# Patient Record
Sex: MALE | Race: WHITE | NOT HISPANIC OR LATINO | Employment: FULL TIME | URBAN - METROPOLITAN AREA
[De-identification: names, ages, dates, MRNs, and addresses within clinical notes are randomized per-mention and may not be internally consistent; named-entity substitution may affect disease eponyms.]

---

## 2017-01-03 ENCOUNTER — HOSPITAL ENCOUNTER (OUTPATIENT)
Dept: OTHER | Facility: HOSPITAL | Age: 52
Discharge: HOME | End: 2017-01-03
Attending: THORACIC SURGERY (CARDIOTHORACIC VASCULAR SURGERY) | Admitting: THORACIC SURGERY (CARDIOTHORACIC VASCULAR SURGERY)

## 2017-01-03 LAB
ANION GAP SERPL CALC-SCNC: 13.5 MMOL/L (ref 10–20)
BUN SERPL-MCNC: 16 MG/DL (ref 8–20)
BUN/CREAT SERPL: 14.5 (ref 6.2–20.3)
CALCIUM SERPL-MCNC: 9.4 MG/DL (ref 8.9–10.3)
CHLORIDE SERPL-SCNC: 105 MMOL/L (ref 101–111)
CONV CO2: 25 MMOL/L (ref 22–32)
CREAT UR-MCNC: 1.1 MG/DL (ref 0.7–1.2)
GLUCOSE SERPL-MCNC: 122 MG/DL (ref 65–99)
POTASSIUM SERPL-SCNC: 3.5 MMOL/L (ref 3.6–5.1)
SODIUM SERPL-SCNC: 140 MMOL/L (ref 136–144)

## 2017-01-04 ENCOUNTER — HOSPITAL ENCOUNTER (OUTPATIENT)
Dept: CT IMAGING | Facility: HOSPITAL | Age: 52
Discharge: HOME | End: 2017-01-04
Attending: THORACIC SURGERY (CARDIOTHORACIC VASCULAR SURGERY) | Admitting: THORACIC SURGERY (CARDIOTHORACIC VASCULAR SURGERY)

## 2017-01-04 LAB — CONV AFP: 5 NG/ML (ref 0–9)

## 2017-01-05 ENCOUNTER — TELEPHONE (OUTPATIENT)
Dept: CARDIAC SURGERY | Facility: CLINIC | Age: 52
End: 2017-01-05

## 2017-01-05 NOTE — TELEPHONE ENCOUNTER
After speaking with Dr Hernández who has viewed the test ordered for Mr Turner I called and spoke to Mr Turner and let him knopw Dr Hernández is ready to proceed with surgery and Gian from our office will contact him shortly to set up a time

## 2017-01-06 ENCOUNTER — TELEPHONE (OUTPATIENT)
Dept: CARDIAC SURGERY | Facility: CLINIC | Age: 52
End: 2017-01-06

## 2017-01-09 LAB — INTERPRETATION: NORMAL

## 2017-01-12 ENCOUNTER — OUTSIDE FACILITY SERVICE (OUTPATIENT)
Dept: CARDIAC SURGERY | Facility: CLINIC | Age: 52
End: 2017-01-12

## 2017-01-12 PROCEDURE — 33030 PARTIAL REMOVAL OF HEART SAC: CPT | Performed by: THORACIC SURGERY (CARDIOTHORACIC VASCULAR SURGERY)

## 2017-01-12 PROCEDURE — 39220 RESECT MEDIASTINAL TUMOR: CPT | Performed by: THORACIC SURGERY (CARDIOTHORACIC VASCULAR SURGERY)

## 2017-01-12 PROCEDURE — 39220 RESECT MEDIASTINAL TUMOR: CPT | Performed by: SPECIALIST/TECHNOLOGIST, OTHER

## 2017-01-12 PROCEDURE — 33030 PARTIAL REMOVAL OF HEART SAC: CPT | Performed by: SPECIALIST/TECHNOLOGIST, OTHER

## 2017-01-19 ENCOUNTER — TELEPHONE (OUTPATIENT)
Dept: CARDIAC SURGERY | Facility: CLINIC | Age: 52
End: 2017-01-19

## 2017-01-19 NOTE — TELEPHONE ENCOUNTER
Mr Johnny has noticed several areas  Shoulder, thighs, torso where he has a red rash. He said he has waited to call but it does not seem to be improving. I spoke with Cleo SOMMERS who suggested that he try benadryl or hydrocortisone cream. He is going to take some benadryl by mouth and will use hydrocortisone cream. If this does not improve over the next day or so he will call back and we will have have him seen in the office

## 2017-01-20 ENCOUNTER — DOCUMENTATION (OUTPATIENT)
Dept: CARDIAC SURGERY | Facility: CLINIC | Age: 52
End: 2017-01-20

## 2017-01-20 NOTE — PROGRESS NOTES
Pathology report from 1/12/2017 was posted today.  Obtained and results noted.  Call placed to Dr. Hernández and report faxed to [facility] for him to review after he finished his case.  Total path and report from [facility] consultation faxed.  Telephone number and cell number sent to Dr. Hernández as well.

## 2017-01-24 ENCOUNTER — TELEPHONE (OUTPATIENT)
Dept: CARDIAC SURGERY | Facility: CLINIC | Age: 52
End: 2017-01-24

## 2017-01-24 NOTE — TELEPHONE ENCOUNTER
Dr Hernández spoke with Dr Dover and asked that I follow up this call and arrange an appointment for Mr Turner. I spoke with Clarence the  who in turn discussed with Dr Dover. Mr Turner will be seen this week 1/26/17 at 8:30 by Dr Cardoso. I spoke with him and this time was agreeable to him

## 2017-01-26 ENCOUNTER — CONSULT (OUTPATIENT)
Dept: ONCOLOGY | Facility: CLINIC | Age: 52
End: 2017-01-26

## 2017-01-26 ENCOUNTER — LAB (OUTPATIENT)
Dept: LAB | Facility: HOSPITAL | Age: 52
End: 2017-01-26

## 2017-01-26 VITALS
DIASTOLIC BLOOD PRESSURE: 72 MMHG | RESPIRATION RATE: 16 BRPM | WEIGHT: 160.2 LBS | HEIGHT: 69 IN | SYSTOLIC BLOOD PRESSURE: 120 MMHG | OXYGEN SATURATION: 96 % | TEMPERATURE: 97.5 F | HEART RATE: 92 BPM | BODY MASS INDEX: 23.73 KG/M2

## 2017-01-26 DIAGNOSIS — C38.3 EXTRAGONADAL GERM CELL TUMOR OF MEDIASTINUM (HCC): ICD-10-CM

## 2017-01-26 DIAGNOSIS — J98.59 MEDIASTINAL MASS: Primary | ICD-10-CM

## 2017-01-26 LAB
ALBUMIN SERPL-MCNC: 3.9 G/DL (ref 3.5–5.2)
ALBUMIN/GLOB SERPL: 1.3 G/DL (ref 1.1–2.4)
ALP SERPL-CCNC: 121 U/L (ref 38–116)
ALT SERPL W P-5'-P-CCNC: 22 U/L (ref 0–41)
ANION GAP SERPL CALCULATED.3IONS-SCNC: 13 MMOL/L
AST SERPL-CCNC: 19 U/L (ref 0–40)
BASOPHILS # BLD AUTO: 0.05 10*3/MM3 (ref 0–0.1)
BASOPHILS NFR BLD AUTO: 0.7 % (ref 0–1.1)
BILIRUB SERPL-MCNC: 0.3 MG/DL (ref 0.1–1.2)
BUN BLD-MCNC: 18 MG/DL (ref 6–20)
BUN/CREAT SERPL: 15.7 (ref 7.3–30)
CALCIUM SPEC-SCNC: 9.5 MG/DL (ref 8.5–10.2)
CHLORIDE SERPL-SCNC: 103 MMOL/L (ref 98–107)
CO2 SERPL-SCNC: 26 MMOL/L (ref 22–29)
CREAT BLD-MCNC: 1.15 MG/DL (ref 0.7–1.3)
DEPRECATED RDW RBC AUTO: 41.2 FL (ref 37–49)
EOSINOPHIL # BLD AUTO: 0.25 10*3/MM3 (ref 0–0.36)
EOSINOPHIL NFR BLD AUTO: 3.7 % (ref 1–5)
ERYTHROCYTE [DISTWIDTH] IN BLOOD BY AUTOMATED COUNT: 13.4 % (ref 11.7–14.5)
GFR SERPL CREATININE-BSD FRML MDRD: 67 ML/MIN/1.73
GLOBULIN UR ELPH-MCNC: 2.9 GM/DL (ref 1.8–3.5)
GLUCOSE BLD-MCNC: 112 MG/DL (ref 74–124)
HCT VFR BLD AUTO: 38.4 % (ref 40–49)
HGB BLD-MCNC: 12.8 G/DL (ref 13.5–16.5)
HOLD SPECIMEN: NORMAL
IMM GRANULOCYTES # BLD: 0.15 10*3/MM3 (ref 0–0.03)
IMM GRANULOCYTES NFR BLD: 2.2 % (ref 0–0.5)
LDH SERPL-CCNC: 171 U/L (ref 99–259)
LYMPHOCYTES # BLD AUTO: 2.76 10*3/MM3 (ref 1–3.5)
LYMPHOCYTES NFR BLD AUTO: 41.2 % (ref 20–49)
MCH RBC QN AUTO: 29 PG (ref 27–33)
MCHC RBC AUTO-ENTMCNC: 33.3 G/DL (ref 32–35)
MCV RBC AUTO: 87.1 FL (ref 83–97)
MONOCYTES # BLD AUTO: 0.65 10*3/MM3 (ref 0.25–0.8)
MONOCYTES NFR BLD AUTO: 9.7 % (ref 4–12)
NEUTROPHILS # BLD AUTO: 2.84 10*3/MM3 (ref 1.5–7)
NEUTROPHILS NFR BLD AUTO: 42.5 % (ref 39–75)
NRBC BLD MANUAL-RTO: 0 /100 WBC (ref 0–0)
PLATELET # BLD AUTO: 469 10*3/MM3 (ref 150–375)
PMV BLD AUTO: 8.2 FL (ref 8.9–12.1)
POTASSIUM BLD-SCNC: 4.4 MMOL/L (ref 3.5–4.7)
PROT SERPL-MCNC: 6.8 G/DL (ref 6.3–8)
RBC # BLD AUTO: 4.41 10*6/MM3 (ref 4.3–5.5)
SODIUM BLD-SCNC: 142 MMOL/L (ref 134–145)
WBC NRBC COR # BLD: 6.7 10*3/MM3 (ref 4–10)
WHOLE BLOOD HOLD SPECIMEN: NORMAL
WHOLE BLOOD HOLD SPECIMEN: NORMAL

## 2017-01-26 PROCEDURE — 99245 OFF/OP CONSLTJ NEW/EST HI 55: CPT | Performed by: INTERNAL MEDICINE

## 2017-01-26 PROCEDURE — 85025 COMPLETE CBC W/AUTO DIFF WBC: CPT | Performed by: INTERNAL MEDICINE

## 2017-01-26 PROCEDURE — 80053 COMPREHEN METABOLIC PANEL: CPT | Performed by: INTERNAL MEDICINE

## 2017-01-26 PROCEDURE — 83615 LACTATE (LD) (LDH) ENZYME: CPT | Performed by: INTERNAL MEDICINE

## 2017-01-26 PROCEDURE — 36415 COLL VENOUS BLD VENIPUNCTURE: CPT | Performed by: INTERNAL MEDICINE

## 2017-01-27 LAB
AFP-TM SERPL-MCNC: 2.9 NG/ML (ref 0–8.3)
HCG INTACT+B SERPL-ACNC: <1 MIU/ML (ref 0–3)

## 2017-01-27 NOTE — PROGRESS NOTES
Subjective     REASON FOR CONSULTATION:  Mediastinal germ cell tumor  Provide an opinion on any further workup or treatment                             REQUESTING PHYSICIAN:  Jerome Hernández MD    RECORDS OBTAINED:  Records of the patients history including those obtained from the referring provider were reviewed and summarized in detail.    HISTORY OF PRESENT ILLNESS:  The patient is a 51 y.o. year old male who is here for an opinion about the above issue.    History of Present Illness patient is a 51-year-old male who presented to his family doctor in September 2015 with a cough that developed while he was lying on his back.  Patient was felt to have allergies and given some medications for this but the symptoms don't improve.  He was then referred to ENT who evaluated him and scoped him and thought he had reflux symptoms and gave her medications for this.  Patient still do not get better once back to see the ENT doctor was CAT scan the sinuses and thought he may have some sinus problems.  He was then referred to an allergist who diagnosed some allergies and gave her medications again with no significant improvement.  He is treated for asthma and again did not improve.  Finally when his symptoms worsened a chest CT was done the end of December which showed a 16 x 10 x 11 cm anterior mediastinal mass with no parenchymal nodules or adenopathy in the chest or abdomen.  Patient saw Dr. Hernández who ordered beta hCG and alpha-fetoprotein which were normal and then proceeded with a resection of the tumor which was done on 1/12/17 .    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  The tumor was lifted  off the great vessels and apparently was completely resected in toto.  The cut surface of the tumor showed multiple cysts with very little solid tissue present between cystic lesions in numerous areas of possible calcification was identified identified.  The slides were reviewed at [facility] who confirmed the diagnosis.    Stop really the patient has done very well and his breathing and discomfort have dramatically improved and he stopped most of his inhalers and asthma medications.  He's had no weight loss headache or other systemic complaints.  The patient reports chronic back pain due to an injury to L4-L5 and S1 many years ago and some frequency of urination at night but otherwise feels very well and actually moved into a new home 2 weeks before his surgery.    Past Medical History   Diagnosis Date   • Anxiety    • Hyperlipidemia    • Low iron         Past Surgical History   Procedure Laterality Date   • Endoscopy     • Colonoscopy     • Foot surgery       right   • Knee arthroscopy       right   • Lasik     • Colonoscopy N/A 7/25/2016     Procedure: COLONOSCOPY INTO CECUM/TERMINAL ILEUM WITH POLYPECTOMY;  Surgeon: Jarvis Garcia MD;  Location: [facility] ENDOSCOPY;  Service:    • Endoscopy N/A 7/25/2016     Procedure: ESOPHAGOGASTRODUODENOSCOPY WITH DILATATION;  Surgeon: Jarvis Garcia MD;  Location: [facility] ENDOSCOPY;  Service:         Current Outpatient Prescriptions on File Prior to Visit   Medication Sig Dispense Refill   • ALPRAZolam (XANAX) 0.25 MG tablet take 1 tablet by mouth four times a day if needed  0   • diphenhydrAMINE (BENADRYL) 25 mg capsule Take 25 mg by mouth as needed for itching.     • DULERA 100-5 MCG/ACT inhaler INHALE 2 INHALATIONS TWICE DAILY WITH SPACER DEVICE. USE REGULARLY. RINSE MOUTH AFTER USE  1   • gemfibrozil (LOPID) 600 MG tablet   0   • Ibuprofen (MOTRIN PO) Take  by mouth as needed.     • Multiple Vitamins-Minerals (MULTI COMPLETE PO) Take  by mouth daily.     •  "oxybutynin (DITROPAN) 5 MG tablet   0   • [DISCONTINUED] gabapentin (NEURONTIN) 300 MG capsule   0   • [DISCONTINUED] montelukast (SINGULAIR) 5 MG chewable tablet Chew 5 mg every night.     • [DISCONTINUED] simvastatin (ZOCOR) 20 MG tablet   0   • [DISCONTINUED] traMADol (ULTRAM) 50 MG tablet   0     No current facility-administered medications on file prior to visit.         ALLERGIES:  No Known Allergies     Social History     Social History   • Marital status:      Spouse name: N/A   • Number of children: N/A   • Years of education: N/A     Social History Main Topics   • Smoking status: Never Smoker   • Smokeless tobacco: Never Used   • Alcohol use Yes      Comment: occ liq or beer   • Drug use: No   • Sexual activity: Not Asked     Other Topics Concern   • None     Social History Narrative        Family History   Problem Relation Age of Onset   • Heart failure Father     parents are in their 70s and healthy his one sister is healthy and 3 children are healthy maternal aunt had an unusual cancer in the gallbladder in her 40s his paternal grandmother had malignancy in her 40s of unknown type    Review of Systems   Genitourinary: Positive for frequency.   Musculoskeletal: Positive for back pain.   Skin: Positive for rash.        Rashes related to tape applied after surgery        Objective     Vitals:    01/26/17 0902   BP: 120/72   Pulse: 92   Resp: 16   Temp: 97.5 °F (36.4 °C)   TempSrc: Oral   SpO2: 96%   Weight: 160 lb 3.2 oz (72.7 kg)   Height: 69\" (175.3 cm)   PainSc:   3   PainLoc: Chest     Current Status 1/26/2017   ECOG score 0       Physical Exam    GENERAL:  Well-developed, well-nourished in no acute distress.   SKIN:  Warm, dry without rashes, purpura or petechiae.  EYES:  Pupils equal, round and reactive to light.  EOMs intact.  Conjunctivae normal.  EARS:  Hearing intact.  NOSE:  Septum midline.  No excoriations or nasal discharge.  MOUTH:  Tongue is well-papillated; no stomatitis or ulcers.  " Lips normal.  THROAT:  Oropharynx without lesions or exudates.  NECK:  Supple with good range of motion; no thyromegaly or masses, no JVD.  LYMPHATICS:  No cervical, supraclavicular, axillary or inguinal adenopathy.  CHEST:  Lungs clear to auscultation. Good airflow.  Sternotomy incision is well-healed  CARDIAC:  Regular rate and rhythm without murmurs, rubs or gallops. Normal S1,S2.  ABDOMEN:  Soft, nontender with no hepatosplenomegaly or masses.  EXTREMITIES:  No clubbing, cyanosis or edema.  NEUROLOGICAL:  Cranial Nerves II-XII grossly intact.  No focal neurological deficits.  PSYCHIATRIC:  Normal affect and mood.        RECENT LABS:  Hematology WBC   Date Value Ref Range Status   01/26/2017 6.70 4.00 - 10.00 10*3/mm3 Final     RBC   Date Value Ref Range Status   01/26/2017 4.41 4.30 - 5.50 10*6/mm3 Final     HEMOGLOBIN   Date Value Ref Range Status   01/26/2017 12.8 (L) 13.5 - 16.5 g/dL Final     HEMATOCRIT   Date Value Ref Range Status   01/26/2017 38.4 (L) 40.0 - 49.0 % Final     PLATELETS   Date Value Ref Range Status   01/26/2017 469 (H) 150 - 375 10*3/mm3 Final          Assessment/Plan     1.  Mediastinal germ cell tumor-?  Completely resected with normal preop alpha fetoprotein beta-hCG    Plan  1.  Repeat tumor markers and LDH today  2.Testicular ultrasound and repeat pulmonary function tests in about 2 weeks  3..  Flow cytometry of his  peripheral blood, because of a relative lymphocytosis and the known association between mediastinal germ cell tumors and bone marrow disorders    We discussed treatment options with him and I explained to Con and his wife that we often make the diagnosis of mediastinal germ cell tumor before complete excision and would usually have given chemotherapy followed by surgery.  Because mediastinal germ cells are high risk and have a poorer  prognosis than gonadal germ cell tumors standard of care has been the use of Velban ifosfamide and cisplatin rather than the standard PEB  regimen but the normal tumor markers prompted surgery.    We talked about surveillance and initiation of chemotherapy for recurrence and I told him that unlike a low risk  gonadal clinical stage I cancer where we can follow closely with surveillance CAT scans, the fact that he is high risk based on his tumor being extragonadal I'm not sure this is a  good  option but I cannot find much data on this particular situation that he is in and I will hopefully get more information from talking to Dr. Gonzalez or his colleagues at [facility] and  send him up there for consultation before any treatment decisions are made.    ADD: I talked to Dr Alfaro in Dr Gonzalez's absence and he felt the malignant cell type was relatively chemoresistant and that observation might be best but he suggested the pt meet with Dr Gonzalez and we will set this up.    Follow-up in 3 weeks with the repeat tumor markers, PFTs and ultrasound of the testicles planned.  A PET scan can be done but I want to wait until he is  further from surgery before scheduling this

## 2017-01-31 ENCOUNTER — HOSPITAL ENCOUNTER (OUTPATIENT)
Dept: CARDIOLOGY | Facility: HOSPITAL | Age: 52
Discharge: HOME | End: 2017-01-31
Attending: INTERNAL MEDICINE | Admitting: INTERNAL MEDICINE

## 2017-02-03 LAB — REF LAB TEST METHOD: NORMAL

## 2017-02-16 ENCOUNTER — APPOINTMENT (OUTPATIENT)
Dept: ONCOLOGY | Facility: CLINIC | Age: 52
End: 2017-02-16

## 2017-02-16 ENCOUNTER — OFFICE VISIT (OUTPATIENT)
Dept: CARDIAC SURGERY | Facility: CLINIC | Age: 52
End: 2017-02-16

## 2017-02-16 ENCOUNTER — APPOINTMENT (OUTPATIENT)
Dept: LAB | Facility: HOSPITAL | Age: 52
End: 2017-02-16

## 2017-02-16 VITALS
HEART RATE: 91 BPM | TEMPERATURE: 97.2 F | RESPIRATION RATE: 20 BRPM | OXYGEN SATURATION: 98 % | HEIGHT: 69 IN | SYSTOLIC BLOOD PRESSURE: 129 MMHG | DIASTOLIC BLOOD PRESSURE: 85 MMHG | BODY MASS INDEX: 24.11 KG/M2 | WEIGHT: 162.8 LBS

## 2017-02-16 DIAGNOSIS — C38.3 EXTRAGONADAL GERM CELL TUMOR OF MEDIASTINUM (HCC): ICD-10-CM

## 2017-02-16 DIAGNOSIS — J98.59 MEDIASTINAL MASS: Primary | ICD-10-CM

## 2017-02-16 PROCEDURE — 99024 POSTOP FOLLOW-UP VISIT: CPT | Performed by: THORACIC SURGERY (CARDIOTHORACIC VASCULAR SURGERY)

## 2017-02-16 RX ORDER — PREDNISONE 20 MG/1
TABLET ORAL
Refills: 0 | COMMUNITY
Start: 2017-01-20 | End: 2017-02-23

## 2017-02-16 RX ORDER — CLARITHROMYCIN 500 MG/1
TABLET, COATED ORAL
COMMUNITY
Start: 2016-12-15 | End: 2017-02-23

## 2017-02-16 RX ORDER — HYDROCODONE BITARTRATE AND ACETAMINOPHEN 5; 325 MG/1; MG/1
TABLET ORAL
Refills: 0 | COMMUNITY
Start: 2017-01-16 | End: 2017-02-16

## 2017-02-19 NOTE — PROGRESS NOTES
2/19/2017      Chief Complaint:     Follow up evaluation post removal of mediastinal mass    History of Present Illness:       Dear Dr. Jimenez and Colleagues,  It was nice to see Julien Turner in follow up evaluation for his teratocarcinoma s/p resection. He is a 51 y.o. male with a history of persistent cough and a giant mediastinal mass s/p radical excision and pericardiectomy by me in 1/12/17 . He did well from surgery and continues to do well. He denies major issues except for residual sternal pain, what is to be expected. Unfortunately, his pathology revealed areas of non seminomatous carcinoma. He was seen in [facility] by a mediastinal tumor specialist. The indication was to follow with serial chest CTs and no radiation or chemotherapy at this point.    Patient Active Problem List   Diagnosis   • Mediastinal mass   • Cough   • Pure hypercholesterolemia   • Intermittent palpitations   • Extragonadal germ cell tumor of mediastinum       Past Medical History   Diagnosis Date   • Anxiety    • Hyperlipidemia    • Low iron        Past Surgical History   Procedure Laterality Date   • Endoscopy     • Colonoscopy     • Foot surgery       right   • Knee arthroscopy       right   • Lasik     • Colonoscopy N/A 7/25/2016     Procedure: COLONOSCOPY INTO CECUM/TERMINAL ILEUM WITH POLYPECTOMY;  Surgeon: Jarvis Garcia MD;  Location: [facility] ENDOSCOPY;  Service:    • Endoscopy N/A 7/25/2016     Procedure: ESOPHAGOGASTRODUODENOSCOPY WITH DILATATION;  Surgeon: Jarvis Garcia MD;  Location: [facility] ENDOSCOPY;  Service:    • Mediastinal mass excision  01/12/2017     Dr Hernández   • Pericardiectomy  01/12/2017     Dr Hernández       No Known Allergies      Current Outpatient Prescriptions:   •  ALPRAZolam (XANAX) 0.25 MG tablet, take 1 tablet by mouth four times a day if needed, Disp: , Rfl: 0  •  clarithromycin (BIAXIN) 500 MG tablet, , Disp: , Rfl:   •  diphenhydrAMINE (BENADRYL) 25 mg capsule, Take 25 mg by mouth as  needed for itching., Disp: , Rfl:   •  DULERA 100-5 MCG/ACT inhaler, INHALE 2 INHALATIONS TWICE DAILY WITH SPACER DEVICE. USE REGULARLY. RINSE MOUTH AFTER USE, Disp: , Rfl: 1  •  gemfibrozil (LOPID) 600 MG tablet, , Disp: , Rfl: 0  •  Ibuprofen (MOTRIN PO), Take  by mouth as needed., Disp: , Rfl:   •  metoprolol tartrate (LOPRESSOR) 25 MG tablet, take 1/2 tablet by mouth twice a day, Disp: , Rfl: 0  •  Multiple Vitamins-Minerals (MULTI COMPLETE PO), Take  by mouth daily., Disp: , Rfl:   •  oxybutynin (DITROPAN) 5 MG tablet, , Disp: , Rfl: 0  •  predniSONE (DELTASONE) 20 MG tablet, take 3 tablets by mouth once daily for 3 days then take 2 tablets...  (REFER TO PRESCRIPTION NOTES)., Disp: , Rfl: 0    Social History     Social History   • Marital status:      Spouse name: Chloé   • Number of children: N/A   • Years of education: N/A     Occupational History   •  [employer] [place]     Social History Main Topics   • Smoking status: Never Smoker   • Smokeless tobacco: Never Used   • Alcohol use Yes      Comment: occ liq or beer   • Drug use: No   • Sexual activity: Not on file     Other Topics Concern   • Not on file     Social History Narrative       Family History   Problem Relation Age of Onset   • Heart failure Father      Review of Systems   Respiratory: Positive for chest tightness.    All other systems reviewed and are negative.      Physical Exam:    Vital Signs:  Weight: 162 lb 12.8 oz (73.8 kg)   Body mass index is 24.04 kg/(m^2).  Temp: 97.2 °F (36.2 °C)   Heart Rate: 91   BP: 129/85     Physical Exam   Constitutional: He is oriented to person, place, and time. He appears well-developed and well-nourished.   HENT:   Head: Normocephalic and atraumatic.   Nose: Nose normal.   Mouth/Throat: Oropharynx is clear and moist.   Eyes: Conjunctivae are normal. Pupils are equal, round, and reactive to light.   Neck: Normal range of motion. Neck supple. No JVD present. No thyromegaly present.    Cardiovascular: Normal rate, regular rhythm, normal heart sounds and intact distal pulses.  Exam reveals no gallop and no friction rub.    No murmur heard.  Pulmonary/Chest: Effort normal and breath sounds normal. He has no rales.   Abdominal: Soft. Bowel sounds are normal. He exhibits no distension and no mass. There is no tenderness.   Musculoskeletal: Normal range of motion. He exhibits no tenderness or deformity.   Lymphadenopathy:     He has no cervical adenopathy.   Neurological: He is alert and oriented to person, place, and time. He has normal reflexes.   Skin: Skin is warm and dry. No rash noted. No erythema.   Psychiatric: He has a normal mood and affect. His behavior is normal.   Sternal incision is well healed     Assessment:     Problem List Items Addressed This Visit        Cardiovascular and Mediastinum    Mediastinal mass - Primary    Extragonadal germ cell tumor of mediastinum        Doing well post surgery. I agree with Oncology plans.  I believe the tumor margins were negative    Recommendation/Plan:     Follow up visit in 6 months      Thank you for allowing me to participate in his care.    Regards,    Jerome Hernández M.D.

## 2017-02-23 ENCOUNTER — OFFICE VISIT (OUTPATIENT)
Dept: ONCOLOGY | Facility: CLINIC | Age: 52
End: 2017-02-23

## 2017-02-23 ENCOUNTER — LAB (OUTPATIENT)
Dept: LAB | Facility: HOSPITAL | Age: 52
End: 2017-02-23

## 2017-02-23 ENCOUNTER — APPOINTMENT (OUTPATIENT)
Dept: ONCOLOGY | Facility: CLINIC | Age: 52
End: 2017-02-23

## 2017-02-23 VITALS
WEIGHT: 165.8 LBS | SYSTOLIC BLOOD PRESSURE: 108 MMHG | TEMPERATURE: 97.8 F | RESPIRATION RATE: 12 BRPM | HEIGHT: 69 IN | OXYGEN SATURATION: 99 % | HEART RATE: 85 BPM | DIASTOLIC BLOOD PRESSURE: 82 MMHG | BODY MASS INDEX: 24.56 KG/M2

## 2017-02-23 DIAGNOSIS — C71.9 PNET (PRIMITIVE NEUROECTODERMAL TUMOR) (HCC): Primary | ICD-10-CM

## 2017-02-23 DIAGNOSIS — C38.3 EXTRAGONADAL GERM CELL TUMOR OF MEDIASTINUM (HCC): Primary | ICD-10-CM

## 2017-02-23 DIAGNOSIS — C38.3 EXTRAGONADAL GERM CELL TUMOR OF MEDIASTINUM (HCC): ICD-10-CM

## 2017-02-23 LAB
ALBUMIN SERPL-MCNC: 4.3 G/DL (ref 3.5–5.2)
ALBUMIN/GLOB SERPL: 1.7 G/DL (ref 1.1–2.4)
ALP SERPL-CCNC: 83 U/L (ref 38–116)
ALT SERPL W P-5'-P-CCNC: 18 U/L (ref 0–41)
ANION GAP SERPL CALCULATED.3IONS-SCNC: 10.9 MMOL/L
AST SERPL-CCNC: 21 U/L (ref 0–40)
BASOPHILS # BLD AUTO: 0.03 10*3/MM3 (ref 0–0.1)
BASOPHILS NFR BLD AUTO: 0.7 % (ref 0–1.1)
BILIRUB SERPL-MCNC: 0.2 MG/DL (ref 0.1–1.2)
BUN BLD-MCNC: 18 MG/DL (ref 6–20)
BUN/CREAT SERPL: 18.4 (ref 7.3–30)
CALCIUM SPEC-SCNC: 9.3 MG/DL (ref 8.5–10.2)
CHLORIDE SERPL-SCNC: 104 MMOL/L (ref 98–107)
CO2 SERPL-SCNC: 26.1 MMOL/L (ref 22–29)
CREAT BLD-MCNC: 0.98 MG/DL (ref 0.7–1.3)
DEPRECATED RDW RBC AUTO: 40.5 FL (ref 37–49)
EOSINOPHIL # BLD AUTO: 0.11 10*3/MM3 (ref 0–0.36)
EOSINOPHIL NFR BLD AUTO: 2.6 % (ref 1–5)
ERYTHROCYTE [DISTWIDTH] IN BLOOD BY AUTOMATED COUNT: 12.6 % (ref 11.7–14.5)
GFR SERPL CREATININE-BSD FRML MDRD: 80 ML/MIN/1.73
GLOBULIN UR ELPH-MCNC: 2.5 GM/DL (ref 1.8–3.5)
GLUCOSE BLD-MCNC: 99 MG/DL (ref 74–124)
HCT VFR BLD AUTO: 39.2 % (ref 40–49)
HGB BLD-MCNC: 12.9 G/DL (ref 13.5–16.5)
HOLD SPECIMEN: NORMAL
IMM GRANULOCYTES # BLD: 0 10*3/MM3 (ref 0–0.03)
IMM GRANULOCYTES NFR BLD: 0 % (ref 0–0.5)
LDH SERPL-CCNC: 145 U/L (ref 99–259)
LYMPHOCYTES # BLD AUTO: 2.41 10*3/MM3 (ref 1–3.5)
LYMPHOCYTES NFR BLD AUTO: 57.8 % (ref 20–49)
MCH RBC QN AUTO: 29 PG (ref 27–33)
MCHC RBC AUTO-ENTMCNC: 32.9 G/DL (ref 32–35)
MCV RBC AUTO: 88.1 FL (ref 83–97)
MONOCYTES # BLD AUTO: 0.43 10*3/MM3 (ref 0.25–0.8)
MONOCYTES NFR BLD AUTO: 10.3 % (ref 4–12)
NEUTROPHILS # BLD AUTO: 1.19 10*3/MM3 (ref 1.5–7)
NEUTROPHILS NFR BLD AUTO: 28.6 % (ref 39–75)
NRBC BLD MANUAL-RTO: 0 /100 WBC (ref 0–0)
PLATELET # BLD AUTO: 294 10*3/MM3 (ref 150–375)
PMV BLD AUTO: 9 FL (ref 8.9–12.1)
POTASSIUM BLD-SCNC: 4.3 MMOL/L (ref 3.5–4.7)
PROT SERPL-MCNC: 6.8 G/DL (ref 6.3–8)
RBC # BLD AUTO: 4.45 10*6/MM3 (ref 4.3–5.5)
SODIUM BLD-SCNC: 141 MMOL/L (ref 134–145)
URATE SERPL-MCNC: 7 MG/DL (ref 2.8–7.4)
WBC NRBC COR # BLD: 4.17 10*3/MM3 (ref 4–10)

## 2017-02-23 PROCEDURE — 99214 OFFICE O/P EST MOD 30 MIN: CPT | Performed by: INTERNAL MEDICINE

## 2017-02-23 PROCEDURE — 84550 ASSAY OF BLOOD/URIC ACID: CPT | Performed by: INTERNAL MEDICINE

## 2017-02-23 PROCEDURE — 36415 COLL VENOUS BLD VENIPUNCTURE: CPT | Performed by: INTERNAL MEDICINE

## 2017-02-23 PROCEDURE — 80053 COMPREHEN METABOLIC PANEL: CPT | Performed by: INTERNAL MEDICINE

## 2017-02-23 PROCEDURE — 83615 LACTATE (LD) (LDH) ENZYME: CPT | Performed by: INTERNAL MEDICINE

## 2017-02-23 PROCEDURE — 85025 COMPLETE CBC W/AUTO DIFF WBC: CPT | Performed by: INTERNAL MEDICINE

## 2017-02-23 NOTE — PROGRESS NOTES
Subjective     REASON FOR FOLLOWUP:    1. 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  Relative lymphocytosis negative flow cytometry                             REQUESTING PHYSICIAN:  Jerome Hernández MD        History of Present Illness patient is a 51-year-old male with recent diagnosis of a mediastinal germ cell tumor which is predominately teratoma with a focus of primitive neuroectodermal tumor.  He was sent to [facility] see Dr. Taj Gonzalez for an opinion regarding adjuvant treatment and Dr. Gonzalez reviewed his case and recommended close follow-up with CAT scans every 2 months for the first 6 months and then less frequently thereafter with plans for re-surgery if there is abnormality detected and then adjuvant treatment with CAV alternating with ifosfamide and etoposide.  Con is very happy with this recommendation but still very anxious because he is been reading on the Internet about neuroectodermal tumor as and realizes they can be very aggressive.  He is otherwise doing well and recovering from surgery nicely.    Past Medical History   Diagnosis Date   • Anxiety    • Hyperlipidemia    • Low iron         Past Surgical History   Procedure Laterality Date   • Endoscopy     • Colonoscopy     • Foot surgery       right   • Knee arthroscopy       right   • Lasik     • Colonoscopy N/A 7/25/2016     Procedure: COLONOSCOPY INTO CECUM/TERMINAL ILEUM WITH POLYPECTOMY;  Surgeon: Jarvis Garcia MD;  Location: [facility] ENDOSCOPY;  Service:    • Endoscopy N/A 7/25/2016     Procedure: ESOPHAGOGASTRODUODENOSCOPY WITH DILATATION;  Surgeon: Jarvis Garcia MD;  Location: [facility] ENDOSCOPY;  Service:    • Mediastinal mass excision  01/12/2017     Dr Hernández   • Pericardiectomy  01/12/2017     Dr Hernández      Oncologic history   patient is a 51-year-old male who presented to his family doctor in  September 2015 with a cough that developed while he was lying on his back.  Patient was felt to have allergies and given some medications for this but the symptoms don't improve.  He was then referred to ENT who evaluated him and scoped him and thought he had reflux symptoms and gave her medications for this.  Patient still do not get better once back to see the ENT doctor was CAT scan the sinuses and thought he may have some sinus problems.  He was then referred to an allergist who diagnosed some allergies and gave her medications again with no significant improvement.  He is treated for asthma and again did not improve.  Finally when his symptoms worsened a chest CT was done the end of December which showed a 16 x 10 x 11 cm anterior mediastinal mass with no parenchymal nodules or adenopathy in the chest or abdomen.  Patient saw Dr. Hernández who ordered beta hCG and alpha-fetoprotein which were normal and then proceeded with a resection of the tumor which was done on 1/12/17 .    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  The tumor was lifted off the great vessels and apparently was completely resected in toto.  The cut surface of the tumor showed multiple cysts with very little solid tissue present between cystic lesions in numerous areas of possible calcification was identified .  The slides were reviewed at [facility] who confirmed the diagnosis.    postop really the patient has done very well and his breathing and discomfort have dramatically improved and he stopped most of his inhalers and asthma medications.  He's had no weight loss headache or other systemic complaints.  The  patient reports chronic back pain due to an injury to L4-L5 and S1 many years ago and some frequency of urination at night but otherwise feels very well and actually moved into a new home 2 weeks before his surgery.     He was sent to [facility] see Dr. Taj Gonzalez for an opinion regarding adjuvant treatment and Dr. Gonzalez reviewed his case and recommended close follow-up with CAT scans every 2 months for the first 6 months and then less frequently thereafter with plans for re-surgery if there is abnormality detected and then adjuvant treatment with CAV alternating with ifosfamide and etoposide.  Flow cytometry of his peripheral blood was unremarkable    Current Outpatient Prescriptions on File Prior to Visit   Medication Sig Dispense Refill   • ALPRAZolam (XANAX) 0.25 MG tablet take 1 tablet by mouth four times a day if needed  0   • gemfibrozil (LOPID) 600 MG tablet   0   • Ibuprofen (MOTRIN PO) Take  by mouth as needed.     • metoprolol tartrate (LOPRESSOR) 25 MG tablet take 1/2 tablet by mouth twice a day  0   • Multiple Vitamins-Minerals (MULTI COMPLETE PO) Take  by mouth daily.     • oxybutynin (DITROPAN) 5 MG tablet   0   • [DISCONTINUED] diphenhydrAMINE (BENADRYL) 25 mg capsule Take 25 mg by mouth as needed for itching.     • [DISCONTINUED] DULERA 100-5 MCG/ACT inhaler INHALE 2 INHALATIONS TWICE DAILY WITH SPACER DEVICE. USE REGULARLY. RINSE MOUTH AFTER USE  1   • [DISCONTINUED] clarithromycin (BIAXIN) 500 MG tablet      • [DISCONTINUED] predniSONE (DELTASONE) 20 MG tablet take 3 tablets by mouth once daily for 3 days then take 2 tablets...  (REFER TO PRESCRIPTION NOTES).  0     No current facility-administered medications on file prior to visit.         ALLERGIES:  No Known Allergies     Social History     Social History   • Marital status:      Spouse name: Chloé   • Number of children: N/A   • Years of education: N/A     Occupational History   • [employer]  "[employer]     Social History Main Topics   • Smoking status: Never Smoker   • Smokeless tobacco: Never Used   • Alcohol use Yes      Comment: occ liq or beer   • Drug use: No   • Sexual activity: Not Asked     Other Topics Concern   • None     Social History Narrative        Family History   Problem Relation Age of Onset   • Heart failure Father    • Hypertension Father    • Cancer Maternal Aunt     parents are in their 70s and healthy his one sister is healthy and 3 children are healthy maternal aunt had an unusual cancer in the gallbladder in her 40s his paternal grandmother had malignancy in her 40s of unknown type    Review of Systems   Cardiovascular: Positive for chest pain.   Musculoskeletal: Positive for back pain.   Skin:        Rashes related to tape applied after surgery   Psychiatric/Behavioral: The patient is nervous/anxious.         Objective     Vitals:    02/23/17 1359   BP: 108/82   Pulse: 85   Resp: 12   Temp: 97.8 °F (36.6 °C)   TempSrc: Oral   SpO2: 99%   Weight: 165 lb 12.8 oz (75.2 kg)   Height: 69.29\" (176 cm)  Comment: new height   PainSc: 2  Comment: chest pain from surgery-mediastinum area     Current Status 2/23/2017   ECOG score 0       Physical Exam    GENERAL:  Well-developed, well-nourished in no acute distress.   SKIN:  Warm, dry without rashes, purpura or petechiae.  EYES:  Pupils equal, round and reactive to light.  EOMs intact.  Conjunctivae normal.  EARS:  Hearing intact.  NOSE:  Septum midline.  No excoriations or nasal discharge.  MOUTH:  Tongue is well-papillated; no stomatitis or ulcers.  Lips normal.  THROAT:  Oropharynx without lesions or exudates.  NECK:  Supple with good range of motion; no thyromegaly or masses, no JVD.  LYMPHATICS:  No cervical, supraclavicular, axillary or inguinal adenopathy.  CHEST:  Lungs clear to auscultation. Good airflow.  Sternotomy incision is well-healed  CARDIAC:  Regular rate and rhythm without murmurs, rubs or gallops. Normal " S1,S2.  ABDOMEN:  Soft, nontender with no hepatosplenomegaly or masses.  EXTREMITIES:  No clubbing, cyanosis or edema.  NEUROLOGICAL:  Cranial Nerves II-XII grossly intact.  No focal neurological deficits.  PSYCHIATRIC:  Normal affect and mood.        RECENT LABS:  Hematology WBC   Date Value Ref Range Status   02/23/2017 4.17 4.00 - 10.00 10*3/mm3 Final     RBC   Date Value Ref Range Status   02/23/2017 4.45 4.30 - 5.50 10*6/mm3 Final     HEMOGLOBIN   Date Value Ref Range Status   02/23/2017 12.9 (L) 13.5 - 16.5 g/dL Final     HEMATOCRIT   Date Value Ref Range Status   02/23/2017 39.2 (L) 40.0 - 49.0 % Final     PLATELETS   Date Value Ref Range Status   02/23/2017 294 150 - 375 10*3/mm3 Final          Assessment/Plan     1.  Mediastinal germ cell tumor-?  Completely resected with normal preop alpha fetoprotein beta-hCG and a focus of primitive neuroectodermal tumor in the resected specimen which is predominantly mature teratoma    Plan  1.  Repeat tumor markers today  2.Baseline CT of the chest in 2 weeks which is 2 months from his surgery and he will call for the results  3.  Follow-up in 11 weeks with a repeat CAT scan of the chest and abdomen 2 months after the last one and repeat tumor markers

## 2017-02-24 LAB
AFP-TM SERPL-MCNC: 3.2 NG/ML (ref 0–8.3)
HCG INTACT+B SERPL-ACNC: <1 MIU/ML (ref 0–3)

## 2017-03-09 ENCOUNTER — HOSPITAL ENCOUNTER (OUTPATIENT)
Dept: PET IMAGING | Facility: HOSPITAL | Age: 52
Discharge: HOME | End: 2017-03-09
Attending: INTERNAL MEDICINE | Admitting: INTERNAL MEDICINE

## 2017-03-09 DIAGNOSIS — C38.3 EXTRAGONADAL GERM CELL TUMOR OF MEDIASTINUM (HCC): ICD-10-CM

## 2017-03-09 DIAGNOSIS — C71.9 PNET (PRIMITIVE NEUROECTODERMAL TUMOR) (HCC): ICD-10-CM

## 2017-03-09 LAB — CREAT BLDA-MCNC: 1.1 MG/DL (ref 0.6–1.3)

## 2017-03-09 PROCEDURE — 71260 CT THORAX DX C+: CPT

## 2017-03-09 PROCEDURE — 82565 ASSAY OF CREATININE: CPT

## 2017-03-09 PROCEDURE — 0 IOPAMIDOL 61 % SOLUTION: Performed by: INTERNAL MEDICINE

## 2017-03-09 RX ADMIN — IOPAMIDOL 75 ML: 612 INJECTION, SOLUTION INTRAVENOUS at 11:30

## 2017-05-04 ENCOUNTER — HOSPITAL ENCOUNTER (OUTPATIENT)
Dept: PET IMAGING | Facility: HOSPITAL | Age: 52
Discharge: HOME | End: 2017-05-04
Attending: INTERNAL MEDICINE | Admitting: INTERNAL MEDICINE

## 2017-05-04 DIAGNOSIS — C38.3 EXTRAGONADAL GERM CELL TUMOR OF MEDIASTINUM (HCC): ICD-10-CM

## 2017-05-04 DIAGNOSIS — C71.9 PNET (PRIMITIVE NEUROECTODERMAL TUMOR) (HCC): ICD-10-CM

## 2017-05-04 LAB — CREAT BLDA-MCNC: 1.1 MG/DL (ref 0.6–1.3)

## 2017-05-04 PROCEDURE — 0 IOPAMIDOL 61 % SOLUTION: Performed by: INTERNAL MEDICINE

## 2017-05-04 PROCEDURE — 0 DIATRIZOATE MEGLUMINE & SODIUM PER 1 ML: Performed by: INTERNAL MEDICINE

## 2017-05-04 PROCEDURE — 74160 CT ABDOMEN W/CONTRAST: CPT

## 2017-05-04 PROCEDURE — 71260 CT THORAX DX C+: CPT

## 2017-05-04 PROCEDURE — 82565 ASSAY OF CREATININE: CPT

## 2017-05-04 RX ADMIN — IOPAMIDOL 85 ML: 612 INJECTION, SOLUTION INTRAVENOUS at 12:25

## 2017-05-04 RX ADMIN — DIATRIZOATE MEGLUMINE AND DIATRIZOATE SODIUM 30 ML: 660; 100 LIQUID ORAL; RECTAL at 11:38

## 2017-05-05 DIAGNOSIS — C38.3 EXTRAGONADAL GERM CELL TUMOR OF MEDIASTINUM (HCC): Primary | ICD-10-CM

## 2017-05-08 ENCOUNTER — OFFICE VISIT (OUTPATIENT)
Dept: ONCOLOGY | Facility: CLINIC | Age: 52
End: 2017-05-08

## 2017-05-08 ENCOUNTER — LAB (OUTPATIENT)
Dept: OTHER | Facility: HOSPITAL | Age: 52
End: 2017-05-08

## 2017-05-08 VITALS
BODY MASS INDEX: 25.03 KG/M2 | SYSTOLIC BLOOD PRESSURE: 114 MMHG | WEIGHT: 169 LBS | RESPIRATION RATE: 16 BRPM | HEART RATE: 73 BPM | HEIGHT: 69 IN | TEMPERATURE: 97.8 F | DIASTOLIC BLOOD PRESSURE: 78 MMHG | OXYGEN SATURATION: 99 %

## 2017-05-08 DIAGNOSIS — C38.3 EXTRAGONADAL GERM CELL TUMOR OF MEDIASTINUM (HCC): ICD-10-CM

## 2017-05-08 DIAGNOSIS — C71.9 PNET (PRIMITIVE NEUROECTODERMAL TUMOR) (HCC): ICD-10-CM

## 2017-05-08 DIAGNOSIS — C71.9 PNET (PRIMITIVE NEUROECTODERMAL TUMOR) (HCC): Primary | ICD-10-CM

## 2017-05-08 LAB
ALBUMIN SERPL-MCNC: 4.5 G/DL (ref 3.5–5.2)
ALBUMIN/GLOB SERPL: 1.6 G/DL
ALP SERPL-CCNC: 100 U/L (ref 39–117)
ALT SERPL W P-5'-P-CCNC: 25 U/L (ref 1–41)
ANION GAP SERPL CALCULATED.3IONS-SCNC: 13.7 MMOL/L
AST SERPL-CCNC: 51 U/L (ref 1–40)
BASOPHILS # BLD AUTO: 0.06 10*3/MM3 (ref 0–0.2)
BASOPHILS NFR BLD AUTO: 1 % (ref 0–1.5)
BILIRUB SERPL-MCNC: 0.3 MG/DL (ref 0.1–1.2)
BUN BLD-MCNC: 18 MG/DL (ref 6–20)
BUN/CREAT SERPL: 17.1 (ref 7–25)
CALCIUM SPEC-SCNC: 9.4 MG/DL (ref 8.6–10.5)
CHLORIDE SERPL-SCNC: 104 MMOL/L (ref 98–107)
CO2 SERPL-SCNC: 26.3 MMOL/L (ref 22–29)
CREAT BLD-MCNC: 1.05 MG/DL (ref 0.76–1.27)
DEPRECATED RDW RBC AUTO: 40.2 FL (ref 37–54)
EOSINOPHIL # BLD AUTO: 1.19 10*3/MM3 (ref 0–0.7)
EOSINOPHIL NFR BLD AUTO: 19.8 % (ref 0.3–6.2)
ERYTHROCYTE [DISTWIDTH] IN BLOOD BY AUTOMATED COUNT: 12.9 % (ref 11.5–14.5)
GFR SERPL CREATININE-BSD FRML MDRD: 74 ML/MIN/1.73
GLOBULIN UR ELPH-MCNC: 2.9 GM/DL
GLUCOSE BLD-MCNC: 91 MG/DL (ref 65–99)
HCT VFR BLD AUTO: 41.9 % (ref 40.4–52.2)
HGB BLD-MCNC: 14.4 G/DL (ref 13.7–17.6)
IMM GRANULOCYTES # BLD: 0.01 10*3/MM3 (ref 0–0.03)
IMM GRANULOCYTES NFR BLD: 0.2 % (ref 0–0.5)
LYMPHOCYTES # BLD AUTO: 2.41 10*3/MM3 (ref 0.9–4.8)
LYMPHOCYTES NFR BLD AUTO: 40.1 % (ref 19.6–45.3)
MCH RBC QN AUTO: 29.8 PG (ref 27–32.7)
MCHC RBC AUTO-ENTMCNC: 34.4 G/DL (ref 32.6–36.4)
MCV RBC AUTO: 86.7 FL (ref 79.8–96.2)
MONOCYTES # BLD AUTO: 0.48 10*3/MM3 (ref 0.2–1.2)
MONOCYTES NFR BLD AUTO: 8 % (ref 5–12)
NEUTROPHILS # BLD AUTO: 1.86 10*3/MM3 (ref 1.9–8.1)
NEUTROPHILS NFR BLD AUTO: 30.9 % (ref 42.7–76)
NRBC BLD MANUAL-RTO: 0 /100 WBC (ref 0–0)
PLATELET # BLD AUTO: 281 10*3/MM3 (ref 140–500)
PMV BLD AUTO: 8.9 FL (ref 6–12)
POTASSIUM BLD-SCNC: 4.5 MMOL/L (ref 3.5–5.2)
PROT SERPL-MCNC: 7.4 G/DL (ref 6–8.5)
RBC # BLD AUTO: 4.83 10*6/MM3 (ref 4.6–6)
SODIUM BLD-SCNC: 144 MMOL/L (ref 136–145)
WBC NRBC COR # BLD: 6.01 10*3/MM3 (ref 4.5–10.7)

## 2017-05-08 PROCEDURE — 80053 COMPREHEN METABOLIC PANEL: CPT | Performed by: INTERNAL MEDICINE

## 2017-05-08 PROCEDURE — 85025 COMPLETE CBC W/AUTO DIFF WBC: CPT | Performed by: INTERNAL MEDICINE

## 2017-05-08 PROCEDURE — 82105 ALPHA-FETOPROTEIN SERUM: CPT | Performed by: INTERNAL MEDICINE

## 2017-05-08 PROCEDURE — 84702 CHORIONIC GONADOTROPIN TEST: CPT | Performed by: INTERNAL MEDICINE

## 2017-05-08 PROCEDURE — 36415 COLL VENOUS BLD VENIPUNCTURE: CPT

## 2017-05-08 PROCEDURE — 99214 OFFICE O/P EST MOD 30 MIN: CPT | Performed by: INTERNAL MEDICINE

## 2017-05-10 LAB
AFP-TM SERPL-MCNC: 3.2 NG/ML (ref 0–8.3)
HCG INTACT+B SERPL-ACNC: <1 MIU/ML (ref 0–3)

## 2017-05-16 ENCOUNTER — OFFICE VISIT (OUTPATIENT)
Dept: GASTROENTEROLOGY | Facility: CLINIC | Age: 52
End: 2017-05-16

## 2017-05-16 VITALS
HEIGHT: 69 IN | HEART RATE: 84 BPM | SYSTOLIC BLOOD PRESSURE: 132 MMHG | BODY MASS INDEX: 25.48 KG/M2 | WEIGHT: 172 LBS | DIASTOLIC BLOOD PRESSURE: 79 MMHG

## 2017-05-16 DIAGNOSIS — R10.33 PERIUMBILICAL ABDOMINAL PAIN: Primary | ICD-10-CM

## 2017-05-16 DIAGNOSIS — D72.10 EOSINOPHILIA: ICD-10-CM

## 2017-05-16 DIAGNOSIS — R79.89 ABNORMAL LIVER FUNCTION TESTS: ICD-10-CM

## 2017-05-16 PROCEDURE — 99214 OFFICE O/P EST MOD 30 MIN: CPT | Performed by: INTERNAL MEDICINE

## 2017-05-16 RX ORDER — CETIRIZINE HYDROCHLORIDE 10 MG/1
10 TABLET ORAL DAILY
COMMUNITY

## 2017-05-16 RX ORDER — OMEPRAZOLE 20 MG/1
20 CAPSULE, DELAYED RELEASE ORAL DAILY
COMMUNITY
End: 2017-12-19

## 2017-05-16 RX ORDER — METRONIDAZOLE 10 MG/G
GEL TOPICAL
COMMUNITY
Start: 2017-05-15 | End: 2018-06-29

## 2017-05-16 RX ORDER — FLUTICASONE PROPIONATE 50 MCG
2 SPRAY, SUSPENSION (ML) NASAL DAILY
COMMUNITY

## 2017-05-16 RX ORDER — DICYCLOMINE HCL 20 MG
20 TABLET ORAL 3 TIMES DAILY PRN
Qty: 30 TABLET | Refills: 1 | Status: SHIPPED | OUTPATIENT
Start: 2017-05-16 | End: 2017-09-25

## 2017-05-21 ENCOUNTER — RESULTS ENCOUNTER (OUTPATIENT)
Dept: GASTROENTEROLOGY | Facility: CLINIC | Age: 52
End: 2017-05-21

## 2017-05-21 DIAGNOSIS — R10.33 PERIUMBILICAL ABDOMINAL PAIN: ICD-10-CM

## 2017-05-21 DIAGNOSIS — D72.10 EOSINOPHILIA: ICD-10-CM

## 2017-05-24 ENCOUNTER — TELEPHONE (OUTPATIENT)
Dept: GASTROENTEROLOGY | Facility: CLINIC | Age: 52
End: 2017-05-24

## 2017-05-25 ENCOUNTER — APPOINTMENT (OUTPATIENT)
Dept: LAB | Facility: HOSPITAL | Age: 52
End: 2017-05-25

## 2017-05-25 LAB
ALBUMIN SERPL-MCNC: 4.3 G/DL (ref 3.5–5.2)
ALP SERPL-CCNC: 85 U/L (ref 39–117)
ALT SERPL W P-5'-P-CCNC: 23 U/L (ref 1–41)
AST SERPL-CCNC: 27 U/L (ref 1–40)
BASOPHILS # BLD AUTO: 0.03 10*3/MM3 (ref 0–0.2)
BASOPHILS NFR BLD AUTO: 0.5 % (ref 0–1.5)
BILIRUB CONJ SERPL-MCNC: <0.2 MG/DL (ref 0–0.3)
BILIRUB INDIRECT SERPL-MCNC: NORMAL MG/DL
BILIRUB SERPL-MCNC: 0.4 MG/DL (ref 0.1–1.2)
DEPRECATED RDW RBC AUTO: 41.7 FL (ref 37–54)
EOSINOPHIL # BLD AUTO: 0.65 10*3/MM3 (ref 0–0.7)
EOSINOPHIL NFR BLD AUTO: 10.5 % (ref 0.3–6.2)
ERYTHROCYTE [DISTWIDTH] IN BLOOD BY AUTOMATED COUNT: 13.2 % (ref 11.5–14.5)
HCT VFR BLD AUTO: 42.1 % (ref 40.4–52.2)
HGB BLD-MCNC: 14.3 G/DL (ref 13.7–17.6)
IMM GRANULOCYTES # BLD: 0 10*3/MM3 (ref 0–0.03)
IMM GRANULOCYTES NFR BLD: 0 % (ref 0–0.5)
LYMPHOCYTES # BLD AUTO: 2.59 10*3/MM3 (ref 0.9–4.8)
LYMPHOCYTES NFR BLD AUTO: 41.8 % (ref 19.6–45.3)
MCH RBC QN AUTO: 29.5 PG (ref 27–32.7)
MCHC RBC AUTO-ENTMCNC: 34 G/DL (ref 32.6–36.4)
MCV RBC AUTO: 86.8 FL (ref 79.8–96.2)
MONOCYTES # BLD AUTO: 0.48 10*3/MM3 (ref 0.2–1.2)
MONOCYTES NFR BLD AUTO: 7.7 % (ref 5–12)
NEUTROPHILS # BLD AUTO: 2.45 10*3/MM3 (ref 1.9–8.1)
NEUTROPHILS NFR BLD AUTO: 39.5 % (ref 42.7–76)
PLATELET # BLD AUTO: 290 10*3/MM3 (ref 140–500)
PMV BLD AUTO: 9.1 FL (ref 6–12)
PROT SERPL-MCNC: 7.3 G/DL (ref 6–8.5)
RBC # BLD AUTO: 4.85 10*6/MM3 (ref 4.6–6)
WBC NRBC COR # BLD: 6.2 10*3/MM3 (ref 4.5–10.7)

## 2017-05-25 PROCEDURE — 36415 COLL VENOUS BLD VENIPUNCTURE: CPT | Performed by: INTERNAL MEDICINE

## 2017-05-25 PROCEDURE — 80076 HEPATIC FUNCTION PANEL: CPT | Performed by: INTERNAL MEDICINE

## 2017-05-25 PROCEDURE — 85025 COMPLETE CBC W/AUTO DIFF WBC: CPT | Performed by: INTERNAL MEDICINE

## 2017-05-31 ENCOUNTER — APPOINTMENT (OUTPATIENT)
Dept: LAB | Facility: HOSPITAL | Age: 52
End: 2017-05-31

## 2017-05-31 PROCEDURE — 87209 SMEAR COMPLEX STAIN: CPT | Performed by: INTERNAL MEDICINE

## 2017-05-31 PROCEDURE — 87177 OVA AND PARASITES SMEARS: CPT | Performed by: INTERNAL MEDICINE

## 2017-06-02 LAB
O+P SPEC MICRO: NORMAL
OVA + PARASITE RESULT 1: NORMAL

## 2017-06-08 ENCOUNTER — TELEPHONE (OUTPATIENT)
Dept: GASTROENTEROLOGY | Facility: CLINIC | Age: 52
End: 2017-06-08

## 2017-06-08 ENCOUNTER — PREP FOR SURGERY (OUTPATIENT)
Dept: OTHER | Facility: HOSPITAL | Age: 52
End: 2017-06-08

## 2017-06-08 ENCOUNTER — OFFICE VISIT (OUTPATIENT)
Dept: GASTROENTEROLOGY | Facility: CLINIC | Age: 52
End: 2017-06-08

## 2017-06-08 VITALS — BODY MASS INDEX: 25.62 KG/M2 | HEIGHT: 69 IN | WEIGHT: 173 LBS

## 2017-06-08 DIAGNOSIS — R13.10 ODYNOPHAGIA: Primary | ICD-10-CM

## 2017-06-08 DIAGNOSIS — R13.14 PHARYNGOESOPHAGEAL DYSPHAGIA: Primary | ICD-10-CM

## 2017-06-08 PROCEDURE — 99213 OFFICE O/P EST LOW 20 MIN: CPT | Performed by: INTERNAL MEDICINE

## 2017-06-08 RX ORDER — SODIUM CHLORIDE, SODIUM LACTATE, POTASSIUM CHLORIDE, CALCIUM CHLORIDE 600; 310; 30; 20 MG/100ML; MG/100ML; MG/100ML; MG/100ML
30 INJECTION, SOLUTION INTRAVENOUS CONTINUOUS
Status: CANCELLED | OUTPATIENT
Start: 2017-06-08

## 2017-06-08 RX ORDER — CLOTRIMAZOLE 10 MG/1
10 LOZENGE ORAL; TOPICAL
Qty: 70 TABLET | Refills: 0 | Status: SHIPPED | OUTPATIENT
Start: 2017-06-08 | End: 2017-09-25

## 2017-06-08 RX ORDER — SODIUM CHLORIDE 0.9 % (FLUSH) 0.9 %
1-10 SYRINGE (ML) INJECTION AS NEEDED
Status: CANCELLED | OUTPATIENT
Start: 2017-06-08

## 2017-06-08 NOTE — TELEPHONE ENCOUNTER
----- Message from Jarvis Garcia MD sent at 6/5/2017 12:54 PM EDT -----  Advise patient the results were normal.

## 2017-06-08 NOTE — PROGRESS NOTES
Subjective   Julien Turner is a 51 y.o. male is here today for follow-up.    Abdominal Pain   This is a recurrent problem. The current episode started 1 to 4 weeks ago. The onset quality is gradual. The problem occurs every several days. The most recent episode lasted 3 hours. The problem has been gradually improving. The pain is located in the suprapubic region. The pain is at a severity of 4/10. The quality of the pain is dull. The abdominal pain radiates to the suprapubic region. Associated symptoms include belching and flatus. Pertinent negatives include no anorexia, arthralgias, constipation, diarrhea, dysuria, fever, frequency, headaches, hematochezia, hematuria, melena, myalgias, nausea, vomiting or weight loss. Nothing aggravates the pain. The pain is relieved by recumbency. Prior diagnostic workup includes CT scan, lower endoscopy and upper endoscopy.   Symptom complexes reported in my last note are better.  His repeat laboratory data was very reassuring.  His liver chemistries returned to normal.  Absolute eosinophilia resolved although his relative eosinophil count is mildly elevated.  However, over the past few weeks he has had odynophagia.  He has multiple allergies but is never been noted to have eosinophilic esophagitis in the past.  He also uses a steroid-containing nasal spray per a virtually every day.    The following portions of the patient's history were reviewed and updated as appropriate: allergies, current medications, past family history, past medical history, past social history, past surgical history and problem list.    Review of Systems   Constitutional: Negative for fever and weight loss.   Gastrointestinal: Positive for abdominal pain and flatus. Negative for anorexia, constipation, diarrhea, hematochezia, melena, nausea and vomiting.   Genitourinary: Negative for dysuria, frequency and hematuria.   Musculoskeletal: Negative for arthralgias and myalgias.   Neurological: Negative for  headaches.       Objective   Physical Exam   Constitutional: He appears well-developed and well-nourished.   Nursing note and vitals reviewed.        Assessment/Plan   Problems Addressed this Visit        Digestive    Odynophagia - Primary        It is possible that he has candida esophagitis so I will treat him with Mycelex troches 5 times daily for 2 weeks.  If he does not respond favorably to this therapy we will proceed with EGD.

## 2017-07-06 ENCOUNTER — HOSPITAL ENCOUNTER (OUTPATIENT)
Dept: PET IMAGING | Facility: HOSPITAL | Age: 52
Discharge: HOME | End: 2017-07-06
Attending: INTERNAL MEDICINE | Admitting: INTERNAL MEDICINE

## 2017-07-06 DIAGNOSIS — C71.9 PNET (PRIMITIVE NEUROECTODERMAL TUMOR) (HCC): ICD-10-CM

## 2017-07-06 LAB — CREAT BLDA-MCNC: 1.1 MG/DL (ref 0.6–1.3)

## 2017-07-06 PROCEDURE — 71260 CT THORAX DX C+: CPT

## 2017-07-06 PROCEDURE — 0 IOPAMIDOL 61 % SOLUTION: Performed by: INTERNAL MEDICINE

## 2017-07-06 PROCEDURE — 82565 ASSAY OF CREATININE: CPT

## 2017-07-06 RX ADMIN — IOPAMIDOL 75 ML: 612 INJECTION, SOLUTION INTRAVENOUS at 10:40

## 2017-07-17 ENCOUNTER — OFFICE VISIT (OUTPATIENT)
Dept: ONCOLOGY | Facility: CLINIC | Age: 52
End: 2017-07-17

## 2017-07-17 ENCOUNTER — APPOINTMENT (OUTPATIENT)
Dept: ONCOLOGY | Facility: CLINIC | Age: 52
End: 2017-07-17

## 2017-07-17 ENCOUNTER — LAB (OUTPATIENT)
Dept: OTHER | Facility: HOSPITAL | Age: 52
End: 2017-07-17

## 2017-07-17 VITALS
DIASTOLIC BLOOD PRESSURE: 79 MMHG | WEIGHT: 174.3 LBS | RESPIRATION RATE: 16 BRPM | OXYGEN SATURATION: 96 % | TEMPERATURE: 97.7 F | HEIGHT: 69 IN | SYSTOLIC BLOOD PRESSURE: 116 MMHG | BODY MASS INDEX: 25.82 KG/M2 | HEART RATE: 96 BPM

## 2017-07-17 DIAGNOSIS — C71.9 PNET (PRIMITIVE NEUROECTODERMAL TUMOR) (HCC): Primary | ICD-10-CM

## 2017-07-17 DIAGNOSIS — C71.9 PNET (PRIMITIVE NEUROECTODERMAL TUMOR) (HCC): ICD-10-CM

## 2017-07-17 DIAGNOSIS — J98.59 MEDIASTINAL MASS: ICD-10-CM

## 2017-07-17 LAB
ALBUMIN SERPL-MCNC: 4.5 G/DL (ref 3.5–5.2)
ALBUMIN/GLOB SERPL: 1.6 G/DL
ALP SERPL-CCNC: 89 U/L (ref 39–117)
ALT SERPL W P-5'-P-CCNC: 21 U/L (ref 1–41)
ANION GAP SERPL CALCULATED.3IONS-SCNC: 15.3 MMOL/L
AST SERPL-CCNC: 24 U/L (ref 1–40)
BASOPHILS # BLD AUTO: 0.05 10*3/MM3 (ref 0–0.2)
BASOPHILS NFR BLD AUTO: 0.9 % (ref 0–1.5)
BILIRUB SERPL-MCNC: 0.3 MG/DL (ref 0.1–1.2)
BUN BLD-MCNC: 18 MG/DL (ref 6–20)
BUN/CREAT SERPL: 15.9 (ref 7–25)
CALCIUM SPEC-SCNC: 9.1 MG/DL (ref 8.6–10.5)
CHLORIDE SERPL-SCNC: 104 MMOL/L (ref 98–107)
CO2 SERPL-SCNC: 24.7 MMOL/L (ref 22–29)
CREAT BLD-MCNC: 1.13 MG/DL (ref 0.76–1.27)
DEPRECATED RDW RBC AUTO: 40.2 FL (ref 37–54)
EOSINOPHIL # BLD AUTO: 0.17 10*3/MM3 (ref 0–0.7)
EOSINOPHIL NFR BLD AUTO: 3.1 % (ref 0.3–6.2)
ERYTHROCYTE [DISTWIDTH] IN BLOOD BY AUTOMATED COUNT: 12.7 % (ref 11.5–14.5)
GFR SERPL CREATININE-BSD FRML MDRD: 68 ML/MIN/1.73
GLOBULIN UR ELPH-MCNC: 2.8 GM/DL
GLUCOSE BLD-MCNC: 119 MG/DL (ref 65–99)
HCT VFR BLD AUTO: 41.5 % (ref 40.4–52.2)
HGB BLD-MCNC: 14.5 G/DL (ref 13.7–17.6)
IMM GRANULOCYTES # BLD: 0.01 10*3/MM3 (ref 0–0.03)
IMM GRANULOCYTES NFR BLD: 0.2 % (ref 0–0.5)
LYMPHOCYTES # BLD AUTO: 2.42 10*3/MM3 (ref 0.9–4.8)
LYMPHOCYTES NFR BLD AUTO: 43.6 % (ref 19.6–45.3)
MCH RBC QN AUTO: 30.2 PG (ref 27–32.7)
MCHC RBC AUTO-ENTMCNC: 34.9 G/DL (ref 32.6–36.4)
MCV RBC AUTO: 86.5 FL (ref 79.8–96.2)
MONOCYTES # BLD AUTO: 0.43 10*3/MM3 (ref 0.2–1.2)
MONOCYTES NFR BLD AUTO: 7.7 % (ref 5–12)
NEUTROPHILS # BLD AUTO: 2.47 10*3/MM3 (ref 1.9–8.1)
NEUTROPHILS NFR BLD AUTO: 44.5 % (ref 42.7–76)
NRBC BLD MANUAL-RTO: 0 /100 WBC (ref 0–0)
PLATELET # BLD AUTO: 296 10*3/MM3 (ref 140–500)
PMV BLD AUTO: 8.8 FL (ref 6–12)
POTASSIUM BLD-SCNC: 3.9 MMOL/L (ref 3.5–5.2)
PROT SERPL-MCNC: 7.3 G/DL (ref 6–8.5)
RBC # BLD AUTO: 4.8 10*6/MM3 (ref 4.6–6)
SODIUM BLD-SCNC: 144 MMOL/L (ref 136–145)
WBC NRBC COR # BLD: 5.55 10*3/MM3 (ref 4.5–10.7)

## 2017-07-17 PROCEDURE — 84702 CHORIONIC GONADOTROPIN TEST: CPT | Performed by: INTERNAL MEDICINE

## 2017-07-17 PROCEDURE — 36415 COLL VENOUS BLD VENIPUNCTURE: CPT

## 2017-07-17 PROCEDURE — 82105 ALPHA-FETOPROTEIN SERUM: CPT | Performed by: INTERNAL MEDICINE

## 2017-07-17 PROCEDURE — 85025 COMPLETE CBC W/AUTO DIFF WBC: CPT | Performed by: INTERNAL MEDICINE

## 2017-07-17 PROCEDURE — 99213 OFFICE O/P EST LOW 20 MIN: CPT | Performed by: NURSE PRACTITIONER

## 2017-07-17 PROCEDURE — 80053 COMPREHEN METABOLIC PANEL: CPT | Performed by: INTERNAL MEDICINE

## 2017-07-18 NOTE — PROGRESS NOTES
"  Subjective     REASON FOR FOLLOWUP:    1. 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  Relative lymphocytosis negative flow cytometry  3.  Close follow-up planned per recommendation of Dr. Gonzalez at [facility]                             REQUESTING PHYSICIAN:  Jerome Hernández MD        History of Present Illness   Mr. Turner is a 51-year-old male with the above-mentioned history here today for follow-up.  He had CT scans done at the beginning of the month, which have been reviewed with him by Dr. Cardoso.  He does report he also recently had allergy food testing which was all negative.  He continues to have \"reflux symptoms\".  He is been seen by his gastroenterologist, Dr. Jarvis Garcia, who is planning on doing an EGD soon.  He will be following up with Dr. Taj Gonzalez at [facility] at the beginning of August.    He slightly frustrated that he continues to exercise quite a bit but is unable to lose weight.  He denies fevers or chills.  No issues with nausea, vomiting, cons patient diarrhea.       Past Medical History:   Diagnosis Date   • Anxiety    • History of colon polyps    • Hyperlipidemia    • Low iron         Past Surgical History:   Procedure Laterality Date   • COLONOSCOPY     • COLONOSCOPY N/A 7/25/2016    Procedure: COLONOSCOPY INTO CECUM/TERMINAL ILEUM WITH POLYPECTOMY;  Surgeon: Jarvis Garcia MD;  Location: [facility] ENDOSCOPY;  Service:    • ENDOSCOPY     • ENDOSCOPY N/A 7/25/2016    Procedure: ESOPHAGOGASTRODUODENOSCOPY WITH DILATATION;  Surgeon: Jarvis Garcia MD;  Location: [facility] ENDOSCOPY;  Service:    • FOOT SURGERY      right   • KNEE ARTHROSCOPY      right   • LASIK     • MEDIASTINAL MASS EXCISION  01/12/2017    Dr Hernández   • PERICARDIECTOMY  01/12/2017    Dr Hernández   • VASECTOMY        Oncologic history   patient is a 51-year-old male who presented to his " family doctor in September 2015 with a cough that developed while he was lying on his back.  Patient was felt to have allergies and given some medications for this but the symptoms don't improve.  He was then referred to ENT who evaluated him and scoped him and thought he had reflux symptoms and gave her medications for this.  Patient still do not get better once back to see the ENT doctor was CAT scan the sinuses and thought he may have some sinus problems.  He was then referred to an allergist who diagnosed some allergies and gave her medications again with no significant improvement.  He is treated for asthma and again did not improve.  Finally when his symptoms worsened a chest CT was done the end of December which showed a 16 x 10 x 11 cm anterior mediastinal mass with no parenchymal nodules or adenopathy in the chest or abdomen.  Patient saw Dr. Hernández who ordered beta hCG and alpha-fetoprotein which were normal and then proceeded with a resection of the tumor which was done on 1/12/17 .    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  The tumor was lifted off the great vessels and apparently was completely resected in toto.  The cut surface of the tumor showed multiple cysts with very little solid tissue present between cystic lesions in numerous areas of possible calcification was identified .  The slides were reviewed at [facility] who confirmed the diagnosis.    postop really the patient has done very well and his breathing and discomfort have dramatically improved and he stopped most of his inhalers and asthma medications.  He's had no weight loss headache or other systemic  complaints.  The patient reports chronic back pain due to an injury to L4-L5 and S1 many years ago and some frequency of urination at night but otherwise feels very well and actually moved into a new home 2 weeks before his surgery.     He was sent to [facility] see Dr. Taj Gonzalez for an opinion regarding adjuvant treatment and Dr. Gonzalez reviewed his case and recommended close follow-up with CAT scans every 2 months for the first 6 months and then less frequently thereafter with plans for re-surgery if there is abnormality detected and then adjuvant treatment with CAV alternating with ifosfamide and etoposide.  Flow cytometry of his peripheral blood was unremarkable    Current Outpatient Prescriptions on File Prior to Visit   Medication Sig Dispense Refill   • aspirin 81 MG tablet Take 81 mg by mouth Daily.     • cetirizine (zyrTEC) 10 MG tablet Take 10 mg by mouth Daily.     • clotrimazole (MYCELEX) 10 MG clau Take 1 tablet by mouth 5 (Five) Times a Day. 70 tablet 0   • dicyclomine (BENTYL) 20 MG tablet Take 1 tablet by mouth 3 (Three) Times a Day As Needed (cramping). 30 tablet 1   • fluticasone (FLONASE) 50 MCG/ACT nasal spray 2 sprays into each nostril Daily.     • gemfibrozil (LOPID) 600 MG tablet   0   • metroNIDAZOLE (METROGEL) 1 % gel      • Multiple Vitamins-Minerals (MULTI COMPLETE PO) Take  by mouth daily.     • omeprazole (priLOSEC) 20 MG capsule Take 20 mg by mouth Daily.     • oxybutynin (DITROPAN) 5 MG tablet   0     No current facility-administered medications on file prior to visit.         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[employer]     Social History Main Topics   • Smoking status: Never Smoker   • Smokeless tobacco: Never Used  "  • Alcohol use 1.2 oz/week     1 Glasses of wine, 1 Cans of beer per week   • Drug use: No   • Sexual activity: Yes     Partners: Female     Birth control/ protection: Surgical     Other Topics Concern   • None     Social History Narrative        Family History   Problem Relation Age of Onset   • Heart failure Father    • Hypertension Father    • Cancer Maternal Aunt     parents are in their 70s and healthy his one sister is healthy and 3 children are healthy maternal aunt had an unusual cancer in the gallbladder in her 40s his paternal grandmother had malignancy in her 40s of unknown type    Review of Systems   Constitutional: Positive for unexpected weight change. Negative for activity change, appetite change, chills, fatigue and fever.   HENT: Negative.  Negative for mouth sores, nosebleeds and trouble swallowing.    Respiratory: Negative.  Negative for cough and shortness of breath.    Cardiovascular: Negative.  Negative for chest pain and leg swelling.   Gastrointestinal: Negative.  Negative for abdominal pain, constipation, diarrhea, nausea and vomiting.   Genitourinary: Negative.  Negative for difficulty urinating.   Musculoskeletal: Negative.    Skin: Negative for rash.   Neurological: Negative.  Negative for dizziness, weakness and numbness.   Hematological: Negative.  Negative for adenopathy. Does not bruise/bleed easily.   Psychiatric/Behavioral: Negative.  Negative for sleep disturbance.        Objective     Vitals:    07/17/17 1422   BP: 116/79   Pulse: 96   Resp: 16   Temp: 97.7 °F (36.5 °C)   TempSrc: Oral   SpO2: 96%   Weight: 174 lb 4.8 oz (79.1 kg)   Height: 69.29\" (176 cm)   PainSc: 0-No pain     Current Status 7/17/2017   ECOG score 0       Physical Exam   Constitutional: He is oriented to person, place, and time. He appears well-developed and well-nourished.   HENT:   Head: Normocephalic and atraumatic.   Nose: Nose normal.   Mouth/Throat: Oropharynx is clear and moist and mucous membranes are " normal. No oropharyngeal exudate.   Eyes: Pupils are equal, round, and reactive to light.   Neck: Normal range of motion. Neck supple.   Cardiovascular: Normal rate, regular rhythm and normal heart sounds.    Pulmonary/Chest: Effort normal and breath sounds normal. No respiratory distress. He has no wheezes. He has no rhonchi. He has no rales.   Abdominal: Soft. Normal appearance and bowel sounds are normal. He exhibits no distension. There is no hepatosplenomegaly. There is no tenderness.   Musculoskeletal: Normal range of motion. He exhibits no edema.   Lymphadenopathy:     He has no cervical adenopathy.        Right: No supraclavicular adenopathy present.        Left: No supraclavicular adenopathy present.   Neurological: He is alert and oriented to person, place, and time.   Skin: Skin is warm and dry.   Psychiatric: He has a normal mood and affect. His behavior is normal.   Nursing note and vitals reviewed.      RECENT LABS:  Hematology WBC   Date Value Ref Range Status   07/17/2017 5.55 4.50 - 10.70 10*3/mm3 Final     RBC   Date Value Ref Range Status   07/17/2017 4.80 4.60 - 6.00 10*6/mm3 Final     Hemoglobin   Date Value Ref Range Status   07/17/2017 14.5 13.7 - 17.6 g/dL Final     Hematocrit   Date Value Ref Range Status   07/17/2017 41.5 40.4 - 52.2 % Final     Platelets   Date Value Ref Range Status   07/17/2017 296 140 - 500 10*3/mm3 Final        Lab Results   Component Value Date    GLUCOSE 119 (H) 07/17/2017    BUN 18 07/17/2017    CREATININE 1.13 07/17/2017    EGFRIFNONA 68 07/17/2017    BCR 15.9 07/17/2017    K 3.9 07/17/2017    CO2 24.7 07/17/2017    CALCIUM 9.1 07/17/2017    ALBUMIN 4.50 07/17/2017    LABIL2 1.6 07/17/2017    AST 24 07/17/2017    ALT 21 07/17/2017     CT chest 7/6/2017  IMPRESSION:  CONCLUSION: Stable imaging of the chest, no mediastinal or hilar  mass/lymphadenopathy has developed. Tiny left lower lobe pulmonary  nodule remains stable. Continue conservative CT  surveillance.      Assessment/Plan     1.  Mediastinal germ cell tumor-?  Completely resected with normal preop alpha fetoprotein beta-hCG and a focus of primitive neuroectodermal tumor in the resected specimen which is predominantly mature teratoma  2.  Eosinophilia probably due to her allergy shots: currently has resolved.   3.  Atypical nevi to be evaluated by dermatology       PLAN:   1.  Repeat tumor markers today still pending  2. Return in 2 months for follow-up with Dr. Cardoso, and repeat CT scans prior to that visit.    3.  Patient will see Dr. Gonzalez at the beginning of August.

## 2017-07-19 ENCOUNTER — HOSPITAL ENCOUNTER (OUTPATIENT)
Dept: ULTRASOUND IMAGING | Facility: HOSPITAL | Age: 52
Discharge: HOME | End: 2017-07-19
Attending: INTERNAL MEDICINE | Admitting: INTERNAL MEDICINE

## 2017-07-19 DIAGNOSIS — C71.9 PNET (PRIMITIVE NEUROECTODERMAL TUMOR) (HCC): ICD-10-CM

## 2017-07-19 LAB
AFP-TM SERPL-MCNC: 4.4 NG/ML (ref 0–8.3)
HCG INTACT+B SERPL-ACNC: <1 MIU/ML (ref 0–3)

## 2017-07-19 PROCEDURE — 76870 US EXAM SCROTUM: CPT

## 2017-07-26 ENCOUNTER — TELEPHONE (OUTPATIENT)
Dept: ONCOLOGY | Facility: CLINIC | Age: 52
End: 2017-07-26

## 2017-07-26 NOTE — TELEPHONE ENCOUNTER
Per Lis BALES, U/s of testes reviewed and WNL.  Does have a cyst on left testicle that we can refer to urology.  Reviewed with patient and he denied need for referral at this time.  He understands to call if he changes his mind.

## 2017-09-07 ENCOUNTER — OFFICE VISIT (OUTPATIENT)
Dept: CARDIAC SURGERY | Facility: CLINIC | Age: 52
End: 2017-09-07

## 2017-09-07 VITALS
DIASTOLIC BLOOD PRESSURE: 86 MMHG | SYSTOLIC BLOOD PRESSURE: 127 MMHG | WEIGHT: 174.4 LBS | OXYGEN SATURATION: 98 % | RESPIRATION RATE: 20 BRPM | HEIGHT: 69 IN | TEMPERATURE: 97.7 F | BODY MASS INDEX: 25.83 KG/M2 | HEART RATE: 77 BPM

## 2017-09-07 DIAGNOSIS — J98.59 MEDIASTINAL MASS: Primary | ICD-10-CM

## 2017-09-07 DIAGNOSIS — R05.9 COUGH: ICD-10-CM

## 2017-09-07 DIAGNOSIS — C71.9 PNET (PRIMITIVE NEUROECTODERMAL TUMOR) (HCC): ICD-10-CM

## 2017-09-07 PROCEDURE — 99213 OFFICE O/P EST LOW 20 MIN: CPT | Performed by: THORACIC SURGERY (CARDIOTHORACIC VASCULAR SURGERY)

## 2017-09-09 NOTE — PROGRESS NOTES
9/7/17    Chief Complaint:     Follow up evaluation of mediastinal mass    History of Present Illness:       Dear Dr. Byrne and Colleagues,  It was nice to see Julien Turner in follow up evaluation for his chest mass removal. He is a 51 y.o. male with a history of cough and a large mediastinal mass excised by me in 1/17. Pathology revealed a theratocarcinoma, low differentiation. All margins were negative. He was seen by Dr. Goldstein in [facility] for oncologic opinion and recommend no chemotherapy . He denies any problems in the interval, he feels he has normalized his lifestyle and he exercises routinely. His last chest CT showed no mediastinal mass/adenopathy or recurrence.    Patient Active Problem List   Diagnosis   • Mediastinal mass   • Cough   • Pure hypercholesterolemia   • Intermittent palpitations   • PNET (primitive neuroectodermal tumor)   • Periumbilical abdominal pain   • Eosinophilia   • Abnormal liver function tests   • Odynophagia       Past Medical History:   Diagnosis Date   • Anxiety    • History of colon polyps    • Hyperlipidemia    • Low iron        Past Surgical History:   Procedure Laterality Date   • COLONOSCOPY     • COLONOSCOPY N/A 7/25/2016    Procedure: COLONOSCOPY INTO CECUM/TERMINAL ILEUM WITH POLYPECTOMY;  Surgeon: Jarvis Garcia MD;  Location: [facility] ENDOSCOPY;  Service:    • ENDOSCOPY     • ENDOSCOPY N/A 7/25/2016    Procedure: ESOPHAGOGASTRODUODENOSCOPY WITH DILATATION;  Surgeon: Jarvis Garcia MD;  Location: [facility] ENDOSCOPY;  Service:    • FOOT SURGERY      right   • KNEE ARTHROSCOPY      right   • LASIK     • MEDIASTINAL MASS EXCISION  01/12/2017    Dr Hernández   • PERICARDIECTOMY  01/12/2017    Dr Hernández   • VASECTOMY         Allergies   Allergen Reactions   • Tape      Surgical tape         Current Outpatient Prescriptions:   •  aspirin 81 MG tablet, Take 81 mg by mouth Daily., Disp: , Rfl:   •  cetirizine (zyrTEC) 10 MG tablet, Take 10 mg by mouth Daily., Disp: ,  Rfl:   •  clotrimazole (MYCELEX) 10 MG clau, Take 1 tablet by mouth 5 (Five) Times a Day., Disp: 70 tablet, Rfl: 0  •  dicyclomine (BENTYL) 20 MG tablet, Take 1 tablet by mouth 3 (Three) Times a Day As Needed (cramping)., Disp: 30 tablet, Rfl: 1  •  fluticasone (FLONASE) 50 MCG/ACT nasal spray, 2 sprays into each nostril Daily., Disp: , Rfl:   •  gemfibrozil (LOPID) 600 MG tablet, , Disp: , Rfl: 0  •  metroNIDAZOLE (METROGEL) 1 % gel, , Disp: , Rfl:   •  Multiple Vitamins-Minerals (MULTI COMPLETE PO), Take  by mouth daily., Disp: , Rfl:   •  omeprazole (priLOSEC) 20 MG capsule, Take 20 mg by mouth Daily., Disp: , Rfl:   •  oxybutynin (DITROPAN) 5 MG tablet, , Disp: , Rfl: 0    Social History     Social History   • Marital status:      Spouse name: Chloé   • Number of children: N/A   • Years of education: N/A     Occupational History   • Behavorial health [employer]     Social History Main Topics   • Smoking status: Never Smoker   • Smokeless tobacco: Never Used   • Alcohol use 1.2 oz/week     1 Glasses of wine, 1 Cans of beer per week   • Drug use: No   • Sexual activity: Yes     Partners: Female     Birth control/ protection: Surgical     Other Topics Concern   • Not on file     Social History Narrative       Family History   Problem Relation Age of Onset   • Heart failure Father    • Hypertension Father    • Cancer Maternal Aunt      Review of Systems   Constitutional: Negative for fatigue.   Respiratory: Negative for cough and chest tightness.    Cardiovascular: Negative for chest pain and leg swelling.   Genitourinary: Negative for flank pain.   All other systems reviewed and are negative.      Physical Exam:    Vital Signs:  Weight: 174 lb 6.4 oz (79.1 kg)   Body mass index is 25.75 kg/(m^2).  Temp: 97.7 °F (36.5 °C)   Heart Rate: 77   BP: 127/86     Physical Exam   Constitutional: He is oriented to person, place, and time. He appears well-developed and well-nourished.   HENT:    Head: Normocephalic and atraumatic.   Nose: Nose normal.   Mouth/Throat: Oropharynx is clear and moist.   Eyes: Conjunctivae are normal. Pupils are equal, round, and reactive to light.   Neck: Normal range of motion. Neck supple. No JVD present. No thyromegaly present.   Cardiovascular: Normal rate, regular rhythm, normal heart sounds and intact distal pulses.  Exam reveals no gallop and no friction rub.    No murmur heard.  Pulmonary/Chest: Effort normal and breath sounds normal. He has no rales.   Abdominal: Soft. Bowel sounds are normal. He exhibits no distension and no mass. There is no tenderness.   Musculoskeletal: Normal range of motion. He exhibits no tenderness or deformity.   Sternum stable, no cellulitis   Lymphadenopathy:     He has no cervical adenopathy.   Neurological: He is alert and oriented to person, place, and time. He has normal reflexes.   Skin: Skin is warm and dry. No rash noted. No erythema.   Psychiatric: He has a normal mood and affect. His behavior is normal.        Assessment:     Problem List Items Addressed This Visit        Cardiovascular and Mediastinum    Mediastinal mass - Primary       Respiratory    Cough       Other    PNET (primitive neuroectodermal tumor)        Clinically stable post mediastinal mass removal. No evidence of recurrence.    Recommendation/Plan:     I will defer future follow up to oncologist, with yearly CTs and serum markers. I will see him prn.    Thank you for allowing me to participate in his care.    Regards,    Jerome Hernández M.D.

## 2017-09-18 ENCOUNTER — HOSPITAL ENCOUNTER (OUTPATIENT)
Dept: CT IMAGING | Facility: HOSPITAL | Age: 52
Discharge: HOME | End: 2017-09-18
Admitting: NURSE PRACTITIONER

## 2017-09-18 DIAGNOSIS — J98.59 MEDIASTINAL MASS: ICD-10-CM

## 2017-09-18 DIAGNOSIS — C71.9 PNET (PRIMITIVE NEUROECTODERMAL TUMOR) (HCC): ICD-10-CM

## 2017-09-18 PROCEDURE — 82565 ASSAY OF CREATININE: CPT

## 2017-09-18 PROCEDURE — 71260 CT THORAX DX C+: CPT

## 2017-09-18 PROCEDURE — 0 IOPAMIDOL 61 % SOLUTION: Performed by: NURSE PRACTITIONER

## 2017-09-18 RX ADMIN — IOPAMIDOL 75 ML: 612 INJECTION, SOLUTION INTRAVENOUS at 14:56

## 2017-09-19 ENCOUNTER — HOSPITAL ENCOUNTER (OUTPATIENT)
Dept: CARDIOLOGY | Facility: HOSPITAL | Age: 52
Discharge: HOME | End: 2017-09-19
Attending: INTERNAL MEDICINE | Admitting: INTERNAL MEDICINE

## 2017-09-19 LAB — CREAT BLDA-MCNC: 1.3 MG/DL (ref 0.6–1.3)

## 2017-09-25 ENCOUNTER — OFFICE VISIT (OUTPATIENT)
Dept: ONCOLOGY | Facility: CLINIC | Age: 52
End: 2017-09-25

## 2017-09-25 ENCOUNTER — LAB (OUTPATIENT)
Dept: LAB | Facility: HOSPITAL | Age: 52
End: 2017-09-25

## 2017-09-25 VITALS
SYSTOLIC BLOOD PRESSURE: 120 MMHG | HEART RATE: 76 BPM | DIASTOLIC BLOOD PRESSURE: 72 MMHG | BODY MASS INDEX: 26.07 KG/M2 | HEIGHT: 69 IN | RESPIRATION RATE: 16 BRPM | WEIGHT: 176 LBS | TEMPERATURE: 98.2 F

## 2017-09-25 DIAGNOSIS — C71.9 PNET (PRIMITIVE NEUROECTODERMAL TUMOR) (HCC): Primary | ICD-10-CM

## 2017-09-25 DIAGNOSIS — C71.9 PNET (PRIMITIVE NEUROECTODERMAL TUMOR) (HCC): ICD-10-CM

## 2017-09-25 LAB
BASOPHILS # BLD AUTO: 0.04 10*3/MM3 (ref 0–0.1)
BASOPHILS NFR BLD AUTO: 0.8 % (ref 0–1.1)
DEPRECATED RDW RBC AUTO: 38.1 FL (ref 37–49)
EOSINOPHIL # BLD AUTO: 0.22 10*3/MM3 (ref 0–0.36)
EOSINOPHIL NFR BLD AUTO: 4.6 % (ref 1–5)
ERYTHROCYTE [DISTWIDTH] IN BLOOD BY AUTOMATED COUNT: 12 % (ref 11.7–14.5)
HCT VFR BLD AUTO: 40.9 % (ref 40–49)
HGB BLD-MCNC: 14.4 G/DL (ref 13.5–16.5)
IMM GRANULOCYTES # BLD: 0.01 10*3/MM3 (ref 0–0.03)
IMM GRANULOCYTES NFR BLD: 0.2 % (ref 0–0.5)
LYMPHOCYTES # BLD AUTO: 2.3 10*3/MM3 (ref 1–3.5)
LYMPHOCYTES NFR BLD AUTO: 48 % (ref 20–49)
MCH RBC QN AUTO: 30.4 PG (ref 27–33)
MCHC RBC AUTO-ENTMCNC: 35.2 G/DL (ref 32–35)
MCV RBC AUTO: 86.5 FL (ref 83–97)
MONOCYTES # BLD AUTO: 0.48 10*3/MM3 (ref 0.25–0.8)
MONOCYTES NFR BLD AUTO: 10 % (ref 4–12)
NEUTROPHILS # BLD AUTO: 1.74 10*3/MM3 (ref 1.5–7)
NEUTROPHILS NFR BLD AUTO: 36.4 % (ref 39–75)
NRBC BLD MANUAL-RTO: 0 /100 WBC (ref 0–0)
PLATELET # BLD AUTO: 288 10*3/MM3 (ref 150–375)
PMV BLD AUTO: 9.2 FL (ref 8.9–12.1)
RBC # BLD AUTO: 4.73 10*6/MM3 (ref 4.3–5.5)
WBC NRBC COR # BLD: 4.79 10*3/MM3 (ref 4–10)

## 2017-09-25 PROCEDURE — 99214 OFFICE O/P EST MOD 30 MIN: CPT | Performed by: INTERNAL MEDICINE

## 2017-09-25 PROCEDURE — 36416 COLLJ CAPILLARY BLOOD SPEC: CPT | Performed by: INTERNAL MEDICINE

## 2017-09-25 PROCEDURE — 85025 COMPLETE CBC W/AUTO DIFF WBC: CPT | Performed by: INTERNAL MEDICINE

## 2017-09-25 NOTE — PROGRESS NOTES
Subjective     REASON FOR FOLLOWUP:    1. 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  Relative lymphocytosis negative flow cytometry  3.  Close follow-up planned [place] [facility]                             REQUESTING PHYSICIAN:  Jerome Hernández MD        History of Present Illness patient is a 51-year-old male with a metastatic germ cell tumor with predominant teratoma and a small focus of PNET that is being watched with surveillance scans.  His scans in July were fine but I was unavailable that day because of a medical emergency and the nurse practitioner reviewed the scans.  He was complaining of tenderness in the scrotum and an ultrasound was done which was thankfully benign and the symptoms resolved.  Since then he has been to see Dr. Gonzalez and unfortunately due to our  new EMR notes are not routinely forwarded to referring doctors and I have made an effort to have the notes sent to Dr. Gonzalez today along with the disks from his scans and thankfully his CAT scan from last week also is completely benign.  His tumor markers are pending from today but have been negative so far.  Dr. Gonzalez has recommended moving to 6 months  imaging the patient is still very anxious about recurrence of his disease and I tried to reassure him. we'll get one more scan in 3-4 months before moving to 6 months scans.     He is been to the dermatologist to show him the mole that I wanted checked out and they're keeping a close eye on this      Past Medical History:   Diagnosis Date   • Anxiety    • History of colon polyps    • Hyperlipidemia    • Low iron         Past Surgical History:   Procedure Laterality Date   • COLONOSCOPY     • COLONOSCOPY N/A 7/25/2016    Procedure: COLONOSCOPY INTO CECUM/TERMINAL ILEUM WITH POLYPECTOMY;  Surgeon: Jarvis Garcia MD;  Location: [facility]  ENDOSCOPY;  Service:    • ENDOSCOPY     • ENDOSCOPY N/A 7/25/2016    Procedure: ESOPHAGOGASTRODUODENOSCOPY WITH DILATATION;  Surgeon: Jarvis Garcia MD;  Location: [facility] ENDOSCOPY;  Service:    • FOOT SURGERY      right   • KNEE ARTHROSCOPY      right   • LASIK     • MEDIASTINAL MASS EXCISION  01/12/2017    Dr Hernández   • PERICARDIECTOMY  01/12/2017    Dr Hernández   • VASECTOMY        Oncologic history   patient is a 51-year-old male who presented to his family doctor in September 2015 with a cough that developed while he was lying on his back.  Patient was felt to have allergies and given some medications for this but the symptoms don't improve.  He was then referred to ENT who evaluated him and scoped him and thought he had reflux symptoms and gave her medications for this.  Patient still do not get better once back to see the ENT doctor was CAT scan the sinuses and thought he may have some sinus problems.  He was then referred to an allergist who diagnosed some allergies and gave her medications again with no significant improvement.  He is treated for asthma and again did not improve.  Finally when his symptoms worsened a chest CT was done the end of December which showed a 16 x 10 x 11 cm anterior mediastinal mass with no parenchymal nodules or adenopathy in the chest or abdomen.  Patient saw Dr. Hernández who ordered beta hCG and alpha-fetoprotein which were normal and then proceeded with a resection of the tumor which was done on 1/12/17 .    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  The tumor was lifted off the great vessels and apparently was completely resected in toto.  The cut surface of  the tumor showed multiple cysts with very little solid tissue present between cystic lesions in numerous areas of possible calcification was identified .  The slides were reviewed at [facility] who confirmed the diagnosis.    postop really the patient has done very well and his breathing and discomfort have dramatically improved and he stopped most of his inhalers and asthma medications.  He's had no weight loss headache or other systemic complaints.  The patient reports chronic back pain due to an injury to L4-L5 and S1 many years ago and some frequency of urination at night but otherwise feels very well and actually moved into a new home 2 weeks before his surgery.     He was sent to [facility] see Dr. Taj Gonzalez for an opinion regarding adjuvant treatment and Dr. Gonzalez reviewed his case and recommended close follow-up with CAT scans every 2 months for the first 6 months and then less frequently thereafter with plans for re-surgery if there is abnormality detected and then adjuvant treatment with CAV alternating with ifosfamide and etoposide.  Flow cytometry of his peripheral blood was unremarkable    5/17  patient is a 51-year-old male with recent diagnosis of a mediastinal germ cell tumor which is predominately teratoma with a focus of primitive neuroectodermal tumor.  He was sent to [facility] see Dr. Taj Gonzalez for an opinion regarding adjuvant treatment and Dr. Gonzalez reviewed his case and recommended close follow-up with CAT scans every 2 months for the first 6 months and then less frequently thereafter with plans for re-surgery if there is abnormality detected and then adjuvant treatment with CAV alternating with ifosfamide and etoposide.  Con is back to review his CAT scans and overall is feeling well.  He is exercising but not able to lose weight and is actually gained a little weight which she surprised by.  His white count shows a 22% eosinophilia which is new but he  tells me he is resumed his allergy shots last month and this may explain it.  He has no other symptoms at this time  I reviewed his CAT scans and they show no change with some insignificant mediastinal nodes and a small lung nodule that was present prior to his original surgery and 2 hemangiomas in the liver that are stable.  AFP and beta hCG 2 months ago were normal and results are pending today.  7/17  Scans negative patient seen by the nurse practitioner in Dr. Cardoso's absence-tumor markers normal    Current Outpatient Prescriptions on File Prior to Visit   Medication Sig Dispense Refill   • aspirin 81 MG tablet Take 81 mg by mouth Daily.     • cetirizine (zyrTEC) 10 MG tablet Take 10 mg by mouth Daily.     • fluticasone (FLONASE) 50 MCG/ACT nasal spray 2 sprays into each nostril Daily.     • gemfibrozil (LOPID) 600 MG tablet   0   • metroNIDAZOLE (METROGEL) 1 % gel      • Multiple Vitamins-Minerals (MULTI COMPLETE PO) Take  by mouth daily.     • omeprazole (priLOSEC) 20 MG capsule Take 20 mg by mouth Daily.     • [DISCONTINUED] clotrimazole (MYCELEX) 10 MG clau Take 1 tablet by mouth 5 (Five) Times a Day. 70 tablet 0   • [DISCONTINUED] dicyclomine (BENTYL) 20 MG tablet Take 1 tablet by mouth 3 (Three) Times a Day As Needed (cramping). 30 tablet 1   • [DISCONTINUED] oxybutynin (DITROPAN) 5 MG tablet   0     No current facility-administered medications on file prior to visit.         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[employer]     Social History Main Topics   • Smoking status: Never Smoker   • Smokeless tobacco: Never Used   • Alcohol use 1.2 oz/week     1 Glasses of wine, 1 Cans of beer per week   • Drug use: No   • Sexual activity: Yes     Partners:  "Female     Birth control/ protection: Surgical     Other Topics Concern   • None     Social History Narrative        Family History   Problem Relation Age of Onset   • Heart failure Father    • Hypertension Father    • Cancer Maternal Aunt     parents are in their 70s and healthy his one sister is healthy and 3 children are healthy maternal aunt had an unusual cancer in the gallbladder in her 40s his paternal grandmother had malignancy in her 40s of unknown type    Review of Systems     Objective     Vitals:    09/25/17 1521   BP: 120/72   Pulse: 76   Resp: 16   Temp: 98.2 °F (36.8 °C)   Weight: 176 lb (79.8 kg)   Height: 69.29\" (176 cm)   PainSc: 0-No pain     Current Status 9/25/2017   ECOG score 0       Physical Exam    GENERAL:  Well-developed, well-nourished in no acute distress.   SKIN:  Warm, dry without rashes, purpura or petechiae.Numerous atypical nevi noted   EYES:  Pupils equal, round and reactive to light.  EOMs intact.  Conjunctivae normal.  EARS:  Hearing intact.  NOSE:  Septum midline.  No excoriations or nasal discharge.  MOUTH:  Tongue is well-papillated; no stomatitis or ulcers.  Lips normal.  THROAT:  Oropharynx without lesions or exudates.  NECK:  Supple with good range of motion; no thyromegaly or masses, no JVD.  LYMPHATICS:  No cervical, supraclavicular, axillary or inguinal adenopathy.  CHEST:  Lungs clear to auscultation. Good airflow.  Sternotomy incision is well-healed  CARDIAC:  Regular rate and rhythm without murmurs, rubs or gallops. Normal S1,S2.  ABDOMEN:  Soft, nontender with no hepatosplenomegaly or masses.  EXTREMITIES:  No clubbing, cyanosis or edema.  NEUROLOGICAL:  Cranial Nerves II-XII grossly intact.  No focal neurological deficits.  PSYCHIATRIC:  Normal affect and mood.        RECENT LABS:  Hematology WBC   Date Value Ref Range Status   09/25/2017 4.79 4.00 - 10.00 10*3/mm3 Final     RBC   Date Value Ref Range Status   09/25/2017 4.73 4.30 - 5.50 10*6/mm3 Final " "    Hemoglobin   Date Value Ref Range Status   09/25/2017 14.4 13.5 - 16.5 g/dL Final     Hematocrit   Date Value Ref Range Status   09/25/2017 40.9 40.0 - 49.0 % Final     Platelets   Date Value Ref Range Status   09/25/2017 288 150 - 375 10*3/mm3 Final        AFP and beta hCG are normal in 7/17 and pending today     Ct chest       1. The previous large mediastinal mass has been surgically removed and  there is an extremely minimal strandlike fibrotic scarring in the  adjacent left lung anteriorly. There is no mediastinal recurrence. There  is no mediastinal or hilar or axillary adenopathy.      2. The lungs are otherwise clear. There is no pericardial effusion. The  CT images through the upper liver, spleen, both adrenal glands, and  upper poles of both kidneys appear normal.      This report was finalized on 9/19/2017     SCROTAL ULTRASOUND WITH LIMITED VASCULAR IMAGING      HISTORY: 5-year-old male with primitive neuroectodermal tumor of the  chest, left-sided \"mass\", evaluated with ultrasound      COMPARISON: None available      FINDINGS:  1. The testes are normal bilaterally with normal vascular flow  Right: 43 x 29 x 25 mm  Left: 37 x 32 x 27 mm.  2. RIGHT: No abnormality.  3. LEFT: The testis itself is normal. There is a 17 x 13 x 12 mm cyst  associated with the epididymal head..  4. Minimal bilateral hydroceles are incidentally noted.      Assessment/Plan     1.  Mediastinal germ cell tumor-?  Completely resected with normal preop alpha fetoprotein beta-hCG and a focus of primitive neuroectodermal tumor in the resected specimen which is predominantly mature teratoma  2.  Eosinophilia probably due to her allergy shots -resolved  3.  Atypical nevi to be evaluated by dermatology     Plan  1.  return in 3  months with repeat CAT scans   "

## 2017-11-03 ENCOUNTER — HOSPITAL ENCOUNTER (OUTPATIENT)
Dept: MRI IMAGING | Facility: HOSPITAL | Age: 52
Discharge: HOME | End: 2017-11-03
Attending: ORTHOPAEDIC SURGERY | Admitting: ORTHOPAEDIC SURGERY

## 2017-12-12 ENCOUNTER — HOSPITAL ENCOUNTER (OUTPATIENT)
Dept: PET IMAGING | Facility: HOSPITAL | Age: 52
Discharge: HOME | End: 2017-12-12
Attending: INTERNAL MEDICINE | Admitting: INTERNAL MEDICINE

## 2017-12-12 ENCOUNTER — LAB (OUTPATIENT)
Dept: LAB | Facility: HOSPITAL | Age: 52
End: 2017-12-12

## 2017-12-12 DIAGNOSIS — C71.9 PNET (PRIMITIVE NEUROECTODERMAL TUMOR) (HCC): ICD-10-CM

## 2017-12-12 LAB
ALBUMIN SERPL-MCNC: 4 G/DL (ref 3.5–5.2)
ALBUMIN/GLOB SERPL: 1.7 G/DL (ref 1.1–2.4)
ALP SERPL-CCNC: 75 U/L (ref 38–116)
ALT SERPL W P-5'-P-CCNC: 19 U/L (ref 0–41)
ANION GAP SERPL CALCULATED.3IONS-SCNC: 12.7 MMOL/L
AST SERPL-CCNC: 23 U/L (ref 0–40)
BASOPHILS # BLD AUTO: 0.03 10*3/MM3 (ref 0–0.1)
BASOPHILS NFR BLD AUTO: 0.7 % (ref 0–1.1)
BILIRUB SERPL-MCNC: 0.3 MG/DL (ref 0.1–1.2)
BUN BLD-MCNC: 17 MG/DL (ref 6–20)
BUN/CREAT SERPL: 16.7 (ref 7.3–30)
CALCIUM SPEC-SCNC: 8.9 MG/DL (ref 8.5–10.2)
CHLORIDE SERPL-SCNC: 101 MMOL/L (ref 98–107)
CO2 SERPL-SCNC: 23.3 MMOL/L (ref 22–29)
CREAT BLD-MCNC: 1.02 MG/DL (ref 0.7–1.3)
CREAT BLDA-MCNC: 1 MG/DL (ref 0.6–1.3)
DEPRECATED RDW RBC AUTO: 40.5 FL (ref 37–49)
EOSINOPHIL # BLD AUTO: 0.08 10*3/MM3 (ref 0–0.36)
EOSINOPHIL NFR BLD AUTO: 2 % (ref 1–5)
ERYTHROCYTE [DISTWIDTH] IN BLOOD BY AUTOMATED COUNT: 12.4 % (ref 11.7–14.5)
GFR SERPL CREATININE-BSD FRML MDRD: 76 ML/MIN/1.73
GLOBULIN UR ELPH-MCNC: 2.4 GM/DL (ref 1.8–3.5)
GLUCOSE BLD-MCNC: 76 MG/DL (ref 74–124)
HCT VFR BLD AUTO: 37.7 % (ref 40–49)
HGB BLD-MCNC: 13.1 G/DL (ref 13.5–16.5)
IMM GRANULOCYTES # BLD: 0.01 10*3/MM3 (ref 0–0.03)
IMM GRANULOCYTES NFR BLD: 0.2 % (ref 0–0.5)
LYMPHOCYTES # BLD AUTO: 1.93 10*3/MM3 (ref 1–3.5)
LYMPHOCYTES NFR BLD AUTO: 47.1 % (ref 20–49)
MCH RBC QN AUTO: 31 PG (ref 27–33)
MCHC RBC AUTO-ENTMCNC: 34.7 G/DL (ref 32–35)
MCV RBC AUTO: 89.3 FL (ref 83–97)
MONOCYTES # BLD AUTO: 0.42 10*3/MM3 (ref 0.25–0.8)
MONOCYTES NFR BLD AUTO: 10.2 % (ref 4–12)
NEUTROPHILS # BLD AUTO: 1.63 10*3/MM3 (ref 1.5–7)
NEUTROPHILS NFR BLD AUTO: 39.8 % (ref 39–75)
NRBC BLD MANUAL-RTO: 0 /100 WBC (ref 0–0)
PLATELET # BLD AUTO: 264 10*3/MM3 (ref 150–375)
PMV BLD AUTO: 9.4 FL (ref 8.9–12.1)
POTASSIUM BLD-SCNC: 3.8 MMOL/L (ref 3.5–4.7)
PROT SERPL-MCNC: 6.4 G/DL (ref 6.3–8)
RBC # BLD AUTO: 4.22 10*6/MM3 (ref 4.3–5.5)
SODIUM BLD-SCNC: 137 MMOL/L (ref 134–145)
WBC NRBC COR # BLD: 4.1 10*3/MM3 (ref 4–10)

## 2017-12-12 PROCEDURE — 71260 CT THORAX DX C+: CPT

## 2017-12-12 PROCEDURE — 82565 ASSAY OF CREATININE: CPT

## 2017-12-12 PROCEDURE — 0 IOPAMIDOL 61 % SOLUTION: Performed by: INTERNAL MEDICINE

## 2017-12-12 PROCEDURE — 85025 COMPLETE CBC W/AUTO DIFF WBC: CPT | Performed by: INTERNAL MEDICINE

## 2017-12-12 PROCEDURE — 36415 COLL VENOUS BLD VENIPUNCTURE: CPT | Performed by: INTERNAL MEDICINE

## 2017-12-12 PROCEDURE — 80053 COMPREHEN METABOLIC PANEL: CPT | Performed by: INTERNAL MEDICINE

## 2017-12-12 RX ADMIN — IOPAMIDOL 75 ML: 612 INJECTION, SOLUTION INTRAVENOUS at 11:03

## 2017-12-13 LAB
AFP-TM SERPL-MCNC: 2.6 NG/ML (ref 0–8.3)
HCG INTACT+B SERPL-ACNC: <1 MIU/ML (ref 0–3)

## 2017-12-19 ENCOUNTER — APPOINTMENT (OUTPATIENT)
Dept: LAB | Facility: HOSPITAL | Age: 52
End: 2017-12-19

## 2017-12-19 ENCOUNTER — OFFICE VISIT (OUTPATIENT)
Dept: ONCOLOGY | Facility: CLINIC | Age: 52
End: 2017-12-19

## 2017-12-19 VITALS
HEART RATE: 82 BPM | WEIGHT: 177.8 LBS | TEMPERATURE: 97.4 F | DIASTOLIC BLOOD PRESSURE: 82 MMHG | HEIGHT: 69 IN | SYSTOLIC BLOOD PRESSURE: 132 MMHG | RESPIRATION RATE: 16 BRPM | OXYGEN SATURATION: 97 % | BODY MASS INDEX: 26.33 KG/M2

## 2017-12-19 DIAGNOSIS — C71.9 PNET (PRIMITIVE NEUROECTODERMAL TUMOR) (HCC): Primary | ICD-10-CM

## 2017-12-19 DIAGNOSIS — D48.9 TERATOMA: ICD-10-CM

## 2017-12-19 DIAGNOSIS — J98.59 MEDIASTINAL MASS: ICD-10-CM

## 2017-12-19 PROCEDURE — 99214 OFFICE O/P EST MOD 30 MIN: CPT | Performed by: INTERNAL MEDICINE

## 2017-12-19 PROCEDURE — G0463 HOSPITAL OUTPT CLINIC VISIT: HCPCS | Performed by: INTERNAL MEDICINE

## 2017-12-19 RX ORDER — PREDNISONE 50 MG/1
50 TABLET ORAL DAILY
Qty: 6 TABLET | Refills: 1 | Status: SHIPPED | OUTPATIENT
Start: 2017-12-19 | End: 2018-06-29

## 2017-12-19 NOTE — PROGRESS NOTES
Subjective     REASON FOR FOLLOWUP:    1. 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  Relative lymphocytosis negative flow cytometry  3.  Close follow-up planned per recommendation of Dr. Gonzalez at [facility]  4.    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  He comes in today for repeat scan and he is very concerned because he has a little bit of a cough and he was worried that his tumor had recurred.  I reviewed the scans and thankfully there is no signs of recurrence.  His AFP and beta-HCG are also normal.  I reassured him that by the time he developed a cough from her recurrence it  would have to be  large and we would see it on scans before this happened and he was comforted by this.  I did tell him I would forward the results of the scans to Dr. Gonzalez who has not made a follow-up with him at this point.  He does feel comfortable moving to 6 months follow-up at this point as it has been 3. months since the last scan     He did notice some epigastric fullness sometimes after large meals and was little concerned about this.  There is no evidence of a hernia on CAT scan and today I could not elicit the fullness despite coughing and straining and lifting his head off the bed and we will keep an eye on this .  He is been to the dermatologist to show him the mole that I wanted checked out and they're keeping a close eye on this    Past Medical History:   Diagnosis Date   • Anxiety    • History of colon polyps    •  Hyperlipidemia    • Low iron         Past Surgical History:   Procedure Laterality Date   • COLONOSCOPY     • COLONOSCOPY N/A 7/25/2016    Procedure: COLONOSCOPY INTO CECUM/TERMINAL ILEUM WITH POLYPECTOMY;  Surgeon: Jarvis Garcia MD;  Location: [facility] ENDOSCOPY;  Service:    • ENDOSCOPY     • ENDOSCOPY N/A 7/25/2016    Procedure: ESOPHAGOGASTRODUODENOSCOPY WITH DILATATION;  Surgeon: Jarvis Garcia MD;  Location: [facility] ENDOSCOPY;  Service:    • FOOT SURGERY      right   • KNEE ARTHROSCOPY      right   • LASIK     • MEDIASTINAL MASS EXCISION  01/12/2017    Dr Hernández   • PERICARDIECTOMY  01/12/2017    Dr Hernández   • VASECTOMY        Oncologic history   patient is a 51-year-old male who presented to his family doctor in September 2015 with a cough that developed while he was lying on his back.  Patient was felt to have allergies and given some medications for this but the symptoms don't improve.  He was then referred to ENT who evaluated him and scoped him and thought he had reflux symptoms and gave her medications for this.  Patient still do not get better once back to see the ENT doctor was CAT scan the sinuses and thought he may have some sinus problems.  He was then referred to an allergist who diagnosed some allergies and gave her medications again with no significant improvement.  He is treated for asthma and again did not improve.  Finally when his symptoms worsened a chest CT was done the end of December which showed a 16 x 10 x 11 cm anterior mediastinal mass with no parenchymal nodules or adenopathy in the chest or abdomen.  Patient saw Dr. Hernández who ordered beta hCG and alpha-fetoprotein which were normal and then proceeded with a resection of the tumor which was done on 1/12/17 .    Path report showed specimen 1 (mediastinal biopsy )which had a benign mature fibroadipose tissue and CAD coronary epithelium with no malignancy specimen 2 (mediastinal biopsy )showed benign mature fibroadipose tissue with no  malignancy and specimen 3 ( mediastinal mass) revealed malignant germ cell tumor with teratoma with focal primitive neuroectodermal components.  The tumor was lifted off the great vessels and apparently was completely resected in toto.  The cut surface of the tumor showed multiple cysts with very little solid tissue present between cystic lesions in numerous areas of possible calcification was identified .  The slides were reviewed at [facility][1] who confirmed the diagnosis.    postop really the patient has done very well and his breathing and discomfort have dramatically improved and he stopped most of his inhalers and asthma medications.  He's had no weight loss headache or other systemic complaints.  The patient reports chronic back pain due to an injury to L4-L5 and S1 many years ago and some frequency of urination at night but otherwise feels very well and actually moved into a new home 2 weeks before his surgery.     He was sent to [facility] see Dr. Taj Gonzalez for an opinion regarding adjuvant treatment and Dr. Gonzalez reviewed his case and recommended close follow-up with CAT scans every 2 months for the first 6 months and then less frequently thereafter with plans for re-surgery if there is abnormality detected and then adjuvant treatment with CAV alternating with ifosfamide and etoposide.  Flow cytometry of his peripheral blood was unremarkable    5/17  patient is a 51-year-old male with recent diagnosis of a mediastinal germ cell tumor which is predominately teratoma with a focus of primitive neuroectodermal tumor.  He was sent to [facility] see Dr. Taj Gonzalez for an opinion regarding adjuvant treatment and Dr. Gonzalez reviewed his case and recommended close follow-up with CAT scans every 2 months for the first 6 months and then less frequently thereafter with plans for re-surgery if there is abnormality detected and then adjuvant treatment with CAV alternating with ifosfamide  and etoposide.  Con is back to review his CAT scans and overall is feeling well.  He is exercising but not able to lose weight and is actually gained a little weight which she surprised by.  His white count shows a 22% eosinophilia which is new but he tells me he is resumed his allergy shots last month and this may explain it.  He has no other symptoms at this time  I reviewed his CAT scans and they show no change with some insignificant mediastinal nodes and a small lung nodule that was present prior to his original surgery and 2 hemangiomas in the liver that are stable.  AFP and beta hCG 2 months ago were normal and results are pending today.  7/17  Scans negative patient seen by the nurse practitioner in Dr. Cardoso's absence-tumor markers normal  9/17    His scans in July were fine but I was unavailable that day because of a medical emergency and the nurse practitioner reviewed the scans.  He was complaining of tenderness in the scrotum and an ultrasound was done which was thankfully benign and the symptoms resolved.  Since then he has been to see Dr. Gonzalez and unfortunately due to our  new EMR notes are not routinely forwarded to referring doctors and I have made an effort to have the notes sent to Dr. Gonzalez today along with the disks from his scans and thankfully his CAT scan from last week also is completely benign.  His tumor markers are pending from today but have been negative so far.  Dr. Gonzalez has recommended moving to 6 months  imaging the patient is still very anxious about recurrence of his disease and I tried to reassure him. we'll get one more scan in 3-4 months before moving to 6 months scans.    Current Outpatient Prescriptions on File Prior to Visit   Medication Sig Dispense Refill   • aspirin 81 MG tablet Take 81 mg by mouth Daily.     • cetirizine (zyrTEC) 10 MG tablet Take 10 mg by mouth Daily.     • fluticasone (FLONASE) 50 MCG/ACT nasal spray 2 sprays into each nostril Daily.     •  "gemfibrozil (LOPID) 600 MG tablet   0   • metroNIDAZOLE (METROGEL) 1 % gel      • Multiple Vitamins-Minerals (MULTI COMPLETE PO) Take  by mouth daily.     • [DISCONTINUED] omeprazole (priLOSEC) 20 MG capsule Take 20 mg by mouth Daily.       No current facility-administered medications on file prior to visit.         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[employer]     Social History Main Topics   • Smoking status: Never Smoker   • Smokeless tobacco: Never Used   • Alcohol use 1.2 oz/week     1 Glasses of wine, 1 Cans of beer per week   • Drug use: No   • Sexual activity: Yes     Partners: Female     Birth control/ protection: Surgical     Other Topics Concern   • None     Social History Narrative        Family History   Problem Relation Age of Onset   • Heart failure Father    • Hypertension Father    • Cancer Maternal Aunt     parents are in their 70s and healthy his one sister is healthy and 3 children are healthy maternal aunt had an unusual cancer in the gallbladder in her 40s his paternal grandmother had malignancy in her 40s of unknown type    Review of Systems     Objective     Vitals:    12/19/17 1413   BP: 132/82   Pulse: 82   Resp: 16   Temp: 97.4 °F (36.3 °C)   TempSrc: Oral   SpO2: 97%   Weight: 80.6 kg (177 lb 12.8 oz)   Height: 176 cm (69.29\")   PainSc: 0-No pain     Current Status 12/19/2017   ECOG score 0       Physical Exam    GENERAL:  Well-developed, well-nourished in no acute distress.   SKIN:  Warm, dry without rashes, purpura or petechiae.Numerous atypical nevi noted   EYES:  Pupils equal, round and reactive to light.  EOMs intact.  Conjunctivae normal.  EARS:  Hearing intact.  NOSE:  Septum midline.  No excoriations or nasal discharge.  MOUTH:  " Tongue is well-papillated; no stomatitis or ulcers.  Lips normal.  THROAT:  Oropharynx without lesions or exudates.  NECK:  Supple with good range of motion; no thyromegaly or masses, no JVD.  LYMPHATICS:  No cervical, supraclavicular, axillary or inguinal adenopathy.  CHEST:  Lungs clear to auscultation. Good airflow.  Sternotomy incision is well-healed  CARDIAC:  Regular rate and rhythm without murmurs, rubs or gallops. Normal S1,S2.  ABDOMEN:  Soft, nontender with no hepatosplenomegaly or masses.  EXTREMITIES:  No clubbing, cyanosis or edema.  NEUROLOGICAL:  Cranial Nerves II-XII grossly intact.  No focal neurological deficits.  PSYCHIATRIC:  Normal affect and mood.        RECENT LABS:  Hematology WBC   Date Value Ref Range Status   12/12/2017 4.10 4.00 - 10.00 10*3/mm3 Final     RBC   Date Value Ref Range Status   12/12/2017 4.22 (L) 4.30 - 5.50 10*6/mm3 Final     Hemoglobin   Date Value Ref Range Status   12/12/2017 13.1 (L) 13.5 - 16.5 g/dL Final     Hematocrit   Date Value Ref Range Status   12/12/2017 37.7 (L) 40.0 - 49.0 % Final     Platelets   Date Value Ref Range Status   12/12/2017 264 150 - 375 10*3/mm3 Final        AFP and beta hCG are normal in 12/17     Ct chest   CONCLUSION: Stable CT scan of the chest similar to 09/18/2017.  Specifically no acute airspace disease has developed. No indication of  subxiphoid hernia/abdominal wall defect.             Assessment/Plan     1.  Mediastinal germ cell tumor-  Completely resected 1/17 with normal preop alpha fetoprotein beta-hCG and a focus of primitive neuroectodermal tumor in the resected specimen which is predominantly mature teratoma  2.  Eosinophilia probably due to her allergy shots -resolved  3.  Atypical nevi to be evaluated by dermatology   4.  Negative  Serial q2 mo  CTs of the chest since 3/17  5.  Tubular adenoma on colonoscopy in [id number] in 3 years    Plan  1.  return in 6  months with repeat CAT scans and afp /bhcg

## 2018-06-26 ENCOUNTER — LAB (OUTPATIENT)
Dept: LAB | Facility: HOSPITAL | Age: 53
End: 2018-06-26

## 2018-06-26 ENCOUNTER — HOSPITAL ENCOUNTER (OUTPATIENT)
Dept: PET IMAGING | Facility: HOSPITAL | Age: 53
Discharge: HOME | End: 2018-06-26
Attending: INTERNAL MEDICINE | Admitting: INTERNAL MEDICINE

## 2018-06-26 DIAGNOSIS — C71.9 PNET (PRIMITIVE NEUROECTODERMAL TUMOR) (HCC): ICD-10-CM

## 2018-06-26 DIAGNOSIS — D48.9 TERATOMA: ICD-10-CM

## 2018-06-26 DIAGNOSIS — J98.59 MEDIASTINAL MASS: ICD-10-CM

## 2018-06-26 LAB
ALBUMIN SERPL-MCNC: 4.3 G/DL (ref 3.5–5.2)
ALBUMIN/GLOB SERPL: 1.4 G/DL (ref 1.1–2.4)
ALP SERPL-CCNC: 83 U/L (ref 38–116)
ALT SERPL W P-5'-P-CCNC: 35 U/L (ref 0–41)
ANION GAP SERPL CALCULATED.3IONS-SCNC: 13.5 MMOL/L
AST SERPL-CCNC: 22 U/L (ref 0–40)
BASOPHILS # BLD AUTO: 0.03 10*3/MM3 (ref 0–0.1)
BASOPHILS NFR BLD AUTO: 0.3 % (ref 0–1.1)
BILIRUB SERPL-MCNC: 0.3 MG/DL (ref 0.1–1.2)
BUN BLD-MCNC: 19 MG/DL (ref 6–20)
BUN/CREAT SERPL: 17.8 (ref 7.3–30)
CALCIUM SPEC-SCNC: 9.7 MG/DL (ref 8.5–10.2)
CHLORIDE SERPL-SCNC: 96 MMOL/L (ref 98–107)
CO2 SERPL-SCNC: 28.5 MMOL/L (ref 22–29)
CREAT BLD-MCNC: 1.07 MG/DL (ref 0.7–1.3)
CREAT BLDA-MCNC: 1.1 MG/DL (ref 0.6–1.3)
DEPRECATED RDW RBC AUTO: 41.7 FL (ref 37–49)
EOSINOPHIL # BLD AUTO: 0.05 10*3/MM3 (ref 0–0.36)
EOSINOPHIL NFR BLD AUTO: 0.5 % (ref 1–5)
ERYTHROCYTE [DISTWIDTH] IN BLOOD BY AUTOMATED COUNT: 12.7 % (ref 11.7–14.5)
GFR SERPL CREATININE-BSD FRML MDRD: 72 ML/MIN/1.73
GLOBULIN UR ELPH-MCNC: 3.1 GM/DL (ref 1.8–3.5)
GLUCOSE BLD-MCNC: 66 MG/DL (ref 74–124)
HCT VFR BLD AUTO: 47.4 % (ref 40–49)
HGB BLD-MCNC: 16.3 G/DL (ref 13.5–16.5)
IMM GRANULOCYTES # BLD: 0.13 10*3/MM3 (ref 0–0.03)
IMM GRANULOCYTES NFR BLD: 1.3 % (ref 0–0.5)
LYMPHOCYTES # BLD AUTO: 2.28 10*3/MM3 (ref 1–3.5)
LYMPHOCYTES NFR BLD AUTO: 22.3 % (ref 20–49)
MCH RBC QN AUTO: 31 PG (ref 27–33)
MCHC RBC AUTO-ENTMCNC: 34.4 G/DL (ref 32–35)
MCV RBC AUTO: 90.1 FL (ref 83–97)
MONOCYTES # BLD AUTO: 0.5 10*3/MM3 (ref 0.25–0.8)
MONOCYTES NFR BLD AUTO: 4.9 % (ref 4–12)
NEUTROPHILS # BLD AUTO: 7.24 10*3/MM3 (ref 1.5–7)
NEUTROPHILS NFR BLD AUTO: 70.7 % (ref 39–75)
NRBC BLD MANUAL-RTO: 0 /100 WBC (ref 0–0)
PLATELET # BLD AUTO: 301 10*3/MM3 (ref 150–375)
PMV BLD AUTO: 9.4 FL (ref 8.9–12.1)
POTASSIUM BLD-SCNC: 4 MMOL/L (ref 3.5–4.7)
PROT SERPL-MCNC: 7.4 G/DL (ref 6.3–8)
RBC # BLD AUTO: 5.26 10*6/MM3 (ref 4.3–5.5)
SODIUM BLD-SCNC: 138 MMOL/L (ref 134–145)
WBC NRBC COR # BLD: 10.23 10*3/MM3 (ref 4–10)

## 2018-06-26 PROCEDURE — 0 DIATRIZOATE MEGLUMINE & SODIUM PER 1 ML: Performed by: INTERNAL MEDICINE

## 2018-06-26 PROCEDURE — 25010000002 IOPAMIDOL 61 % SOLUTION: Performed by: INTERNAL MEDICINE

## 2018-06-26 PROCEDURE — 85025 COMPLETE CBC W/AUTO DIFF WBC: CPT | Performed by: INTERNAL MEDICINE

## 2018-06-26 PROCEDURE — 36415 COLL VENOUS BLD VENIPUNCTURE: CPT | Performed by: INTERNAL MEDICINE

## 2018-06-26 PROCEDURE — 82565 ASSAY OF CREATININE: CPT

## 2018-06-26 PROCEDURE — 71260 CT THORAX DX C+: CPT

## 2018-06-26 PROCEDURE — 80053 COMPREHEN METABOLIC PANEL: CPT | Performed by: INTERNAL MEDICINE

## 2018-06-26 PROCEDURE — 74177 CT ABD & PELVIS W/CONTRAST: CPT

## 2018-06-26 RX ADMIN — DIATRIZOATE MEGLUMINE AND DIATRIZOATE SODIUM 30 ML: 660; 100 LIQUID ORAL; RECTAL at 10:05

## 2018-06-26 RX ADMIN — IOPAMIDOL 85 ML: 612 INJECTION, SOLUTION INTRAVENOUS at 11:00

## 2018-06-27 LAB
AFP-TM SERPL-MCNC: 2.6 NG/ML (ref 0–8.3)
HCG INTACT+B SERPL-ACNC: <1 MIU/ML (ref 0–3)

## 2018-06-29 ENCOUNTER — APPOINTMENT (OUTPATIENT)
Dept: LAB | Facility: HOSPITAL | Age: 53
End: 2018-06-29

## 2018-06-29 ENCOUNTER — OFFICE VISIT (OUTPATIENT)
Dept: ONCOLOGY | Facility: CLINIC | Age: 53
End: 2018-06-29

## 2018-06-29 VITALS
WEIGHT: 167 LBS | SYSTOLIC BLOOD PRESSURE: 110 MMHG | RESPIRATION RATE: 14 BRPM | DIASTOLIC BLOOD PRESSURE: 70 MMHG | OXYGEN SATURATION: 99 % | HEIGHT: 69 IN | TEMPERATURE: 97.7 F | HEART RATE: 71 BPM | BODY MASS INDEX: 24.73 KG/M2

## 2018-06-29 DIAGNOSIS — D48.9 TERATOMA: Primary | ICD-10-CM

## 2018-06-29 DIAGNOSIS — C71.9 PNET (PRIMITIVE NEUROECTODERMAL TUMOR) (HCC): ICD-10-CM

## 2018-06-29 PROCEDURE — 99214 OFFICE O/P EST MOD 30 MIN: CPT | Performed by: INTERNAL MEDICINE

## 2018-06-29 NOTE — PROGRESS NOTES
Subjective     REASON FOR FOLLOWUP:    1. 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  Relative lymphocytosis negative flow cytometry  3.  Close follow-up planned per recommendation of Dr. [facility] at [facility]  4.    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  He comes in today for repeat scan and he is doing very well.      CAT scan showed no evidence of progressive disease but soon after the CAT scan he developed a tremendous rash all over his body which is pruritic from the IV contrast.  I did see a photograph of this on his phone and is fairly impressive.  He was on steroids for knee injury despite this he had a fairly significant rash therefore I do not feel comfortable premedicating for routine scans  with Benadryl and Tylenol  unless it was absolutely crucial and we will do a PET scan in 6 months and if this is negative as expected can move to noncontrasted CAT scans thereafter  He has noted no testicular swelling-his ultrasound last year and was negative    He is been to the dermatologist to show him the mole that I wanted checked out and they're keeping a close eye on this    Past Medical History:   Diagnosis Date   • Anxiety    • H/O Mediastinal mass    • History of colon polyps    • Hyperlipidemia    • Low iron    • PNET (primitive neuroectodermal tumor) 2017        Past Surgical History:   Procedure Laterality Date   • COLONOSCOPY     • COLONOSCOPY N/A 7/25/2016    Procedure: COLONOSCOPY INTO CECUM/TERMINAL  ILEUM WITH POLYPECTOMY;  Surgeon: Jarvis Garcia MD;  Location: [facility] ENDOSCOPY;  Service:    • ENDOSCOPY     • ENDOSCOPY N/A 7/25/2016    Procedure: ESOPHAGOGASTRODUODENOSCOPY WITH DILATATION;  Surgeon: Jarvis Garcia MD;  Location: [facility] ENDOSCOPY;  Service:    • FOOT SURGERY      right   • KNEE ARTHROSCOPY      right   • LASIK     • MEDIASTINAL MASS EXCISION  01/12/2017    Dr Hernández   • PERICARDIECTOMY  01/12/2017    Dr Hernández   • VASECTOMY        Oncologic history   patient is a 51-year-old male who presented to his family doctor in September 2015 with a cough that developed while he was lying on his back.  Patient was felt to have allergies and given some medications for this but the symptoms don't improve.  He was then referred to ENT who evaluated him and scoped him and thought he had reflux symptoms and gave her medications for this.  Patient still do not get better once back to see the ENT doctor was CAT scan the sinuses and thought he may have some sinus problems.  He was then referred to an allergist who diagnosed some allergies and gave her medications again with no significant improvement.  He is treated for asthma and again did not improve.  Finally when his symptoms worsened a chest CT was done the end of December which showed a 16 x 10 x 11 cm anterior mediastinal mass with no parenchymal nodules or adenopathy in the chest or abdomen.  Patient saw Dr. Hernández who ordered beta hCG and alpha-fetoprotein which were normal and then proceeded with a resection of the tumor which was done on 1/12/17 .    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  The tumor was lifted off the great  vessels and apparently was completely resected in toto.  The cut surface of the tumor showed multiple cysts with very little solid tissue present between cystic lesions in numerous areas of possible calcification was identified .  The slides were reviewed at [facility] who confirmed the diagnosis.    postop really the patient has done very well and his breathing and discomfort have dramatically improved and he stopped most of his inhalers and asthma medications.  He's had no weight loss headache or other systemic complaints.  The patient reports chronic back pain due to an injury to L4-L5 and S1 many years ago and some frequency of urination at night but otherwise feels very well and actually moved into a new home 2 weeks before his surgery.     He was sent to [facility] see Dr. Taj Gonzalez for an opinion regarding adjuvant treatment and Dr. Gonzalez reviewed his case and recommended close follow-up with CAT scans every 2 months for the first 6 months and then less frequently thereafter with plans for re-surgery if there is abnormality detected and then adjuvant treatment with CAV alternating with ifosfamide and etoposide.  Flow cytometry of his peripheral blood was unremarkable    5/17  patient is a 51-year-old male with recent diagnosis of a mediastinal germ cell tumor which is predominately teratoma with a focus of primitive neuroectodermal tumor.  He was sent to [facility] see Dr. Taj Gonzalez for an opinion regarding adjuvant treatment and Dr. Gonzalez reviewed his case and recommended close follow-up with CAT scans every 2 months for the first 6 months and then less frequently thereafter with plans for re-surgery if there is abnormality detected and then adjuvant treatment with CAV alternating with ifosfamide and etoposide.  Con is back to review his CAT scans and overall is feeling well.  He is exercising but not able to lose weight and is actually gained a little weight which she  surprised by.  His white count shows a 22% eosinophilia which is new but he tells me he is resumed his allergy shots last month and this may explain it.  He has no other symptoms at this time  I reviewed his CAT scans and they show no change with some insignificant mediastinal nodes and a small lung nodule that was present prior to his original surgery and 2 hemangiomas in the liver that are stable.  AFP and beta hCG 2 months ago were normal and results are pending today.  7/17  Scans negative patient seen by the nurse practitioner in Dr. Cardoso's absence-tumor markers normal  9/17    His scans in July were fine but I was unavailable that day because of a medical emergency and the nurse practitioner reviewed the scans.  He was complaining of tenderness in the scrotum and an ultrasound was done which was thankfully benign and the symptoms resolved.  Since then he has been to see Dr. Gonzalez and unfortunately due to our  new EMR notes are not routinely forwarded to referring doctors and I have made an effort to have the notes sent to Dr. Gonzalez today along with the disks from his scans and thankfully his CAT scan from last week also is completely benign.  His tumor markers are pending from today but have been negative so far.  Dr. Gonzalez has recommended moving to 6 months  imaging the patient is still very anxious about recurrence of his disease and I tried to reassure him. we'll get one more scan in 3-4 months before moving to 6 months scans.    Current Outpatient Prescriptions on File Prior to Visit   Medication Sig Dispense Refill   • cetirizine (zyrTEC) 10 MG tablet Take 10 mg by mouth Daily.     • fluticasone (FLONASE) 50 MCG/ACT nasal spray 2 sprays into each nostril Daily.     • Multiple Vitamins-Minerals (MULTI COMPLETE PO) Take  by mouth daily.     • [DISCONTINUED] aspirin 81 MG tablet Take 81 mg by mouth Daily.     • [DISCONTINUED] gemfibrozil (LOPID) 600 MG tablet   0   • [DISCONTINUED] metroNIDAZOLE  "(METROGEL) 1 % gel      • [DISCONTINUED] predniSONE (DELTASONE) 50 MG tablet Take 1 tablet by mouth Daily. 6 tablet 1     No current facility-administered medications on file prior to visit.         ALLERGIES:    Allergies   Allergen Reactions   • Contrast Dye Hives     Pt had hives all over chest and itchy palms.    Pt was pre-medicated prior to this scan.   • Tape      Surgical tape        Social History     Social History   • Marital status:      Spouse name: Chloé     Occupational History   • Behavorial health [employer]     Social History Main Topics   • Smoking status: Never Smoker   • Smokeless tobacco: Never Used   • Alcohol use 1.2 oz/week     1 Glasses of wine, 1 Cans of beer per week   • Drug use: No   • Sexual activity: Yes     Partners: Female     Birth control/ protection: Surgical     Other Topics Concern   • Not on file        Family History   Problem Relation Age of Onset   • Heart failure Father    • Hypertension Father    • Cancer Maternal Aunt     parents are in their 70s and healthy his one sister is healthy and 3 children are healthy maternal aunt had an unusual cancer in the gallbladder in her 40s his paternal grandmother had malignancy in her 40s of unknown type    Review of Systems   Constitutional: Negative.    Respiratory: Negative for shortness of breath.    All other systems reviewed and are negative.       Objective     Vitals:    06/29/18 1050   BP: 110/70   Pulse: 71   Resp: 14   Temp: 97.7 °F (36.5 °C)   SpO2: 99%   Weight: 75.8 kg (167 lb)   Height: 176 cm (69.29\")   PainSc: 3  Comment: back and knee     Current Status 6/29/2018   ECOG score 0       Physical Exam    GENERAL:  Well-developed, well-nourished in no acute distress.   SKIN:  Warm, dry without rashes, purpura or petechiae.Numerous atypical nevi noted   EYES:  Pupils equal, round and reactive to light.  EOMs intact.  Conjunctivae normal.  EARS:  Hearing intact.  NOSE:  Septum midline.  No " excoriations or nasal discharge.  MOUTH:  Tongue is well-papillated; no stomatitis or ulcers.  Lips normal.  THROAT:  Oropharynx without lesions or exudates.  NECK:  Supple with good range of motion; no thyromegaly or masses, no JVD.  LYMPHATICS:  No cervical, supraclavicular, axillary or inguinal adenopathy.  CHEST:  Lungs clear to auscultation. Good airflow.  Sternotomy incision is well-healed  CARDIAC:  Regular rate and rhythm without murmurs, rubs or gallops. Normal S1,S2.  ABDOMEN:  Soft, nontender with no hepatosplenomegaly or masses.  EXTREMITIES:  No clubbing, cyanosis or edema.  NEUROLOGICAL:  Cranial Nerves II-XII grossly intact.  No focal neurological deficits.  PSYCHIATRIC:  Normal affect and mood.        RECENT LABS:  Hematology WBC   Date Value Ref Range Status   06/26/2018 10.23 (H) 4.00 - 10.00 10*3/mm3 Final     RBC   Date Value Ref Range Status   06/26/2018 5.26 4.30 - 5.50 10*6/mm3 Final     Hemoglobin   Date Value Ref Range Status   06/26/2018 16.3 13.5 - 16.5 g/dL Final     Hematocrit   Date Value Ref Range Status   06/26/2018 47.4 40.0 - 49.0 % Final     Platelets   Date Value Ref Range Status   06/26/2018 301 150 - 375 10*3/mm3 Final        AFP and beta hCG are normal in 6/18    Ct chest /Abd   FINDINGS:  CHEST: There is no lymphadenopathy within the chest or abnormal  nodularity within the mediastinum. There are no pulmonary airspace  consolidations and there are no pleural or pericardial effusions. There  is no lymphadenopathy. Thyroid appears unremarkable.     ABDOMEN/PELVIS: There is no interval change in the approximately 3 cm  hemangioma at the posterior hepatic segment and stability seen since an  outside facility CT of the abdomen from 01/04/2017. There is also a  stable 1.7 cm hemangioma at the lateral hepatic segment and there are a  few tiny hepatic cysts which are unchanged. The gallbladder, spleen,  pancreas, adrenals, and kidneys appear unremarkable. No acute bowel  abnormality  is seen. There is no free fluid or lymphadenopathy.     IMPRESSION:  Stable examination. There is no evidence for recurrence or  metastatic disease. There is no new abnormality.     This report was finalized on 6/27/2018           Assessment/Plan     1.  Mediastinal germ cell tumor-  Completely resected 1/17 with normal preop alpha fetoprotein beta-hCG and a focus of primitive neuroectodermal tumor in the resected specimen which is predominantly mature teratoma  2.  Eosinophilia probably due to her allergy shots -resolved  3.  Atypical nevi evaluated by dermatology   4.  Negative  Serial q2 mo  CTs of the chest since 3/17  5.  Tubular adenoma on colonoscopy in [id number] in 3 years  6.  Significant allergic rash to IV contrast in 6/18 while on steroids-no further IV contrast as far as possible    Plan  1.  return in 6  months with repeat PET scans and afp /bhcg in 3 months and one week before  2.  Testicular ultrasound annually  3.  His next scan will move to annual scans per Dr. Gonzalez's recommendation tumor markers every 6 months

## 2018-07-06 ENCOUNTER — APPOINTMENT (OUTPATIENT)
Dept: ULTRASOUND IMAGING | Facility: HOSPITAL | Age: 53
End: 2018-07-06
Attending: INTERNAL MEDICINE

## 2018-07-20 ENCOUNTER — HOSPITAL ENCOUNTER (OUTPATIENT)
Dept: ULTRASOUND IMAGING | Facility: HOSPITAL | Age: 53
Discharge: HOME | End: 2018-07-20
Attending: INTERNAL MEDICINE | Admitting: INTERNAL MEDICINE

## 2018-07-20 DIAGNOSIS — D48.9 TERATOMA: ICD-10-CM

## 2018-07-20 PROCEDURE — 76870 US EXAM SCROTUM: CPT

## 2018-09-21 ENCOUNTER — LAB (OUTPATIENT)
Dept: LAB | Facility: HOSPITAL | Age: 53
End: 2018-09-21

## 2018-09-21 DIAGNOSIS — D48.9 TERATOMA: ICD-10-CM

## 2018-09-21 LAB
BASOPHILS # BLD AUTO: 0.02 10*3/MM3 (ref 0–0.1)
BASOPHILS NFR BLD AUTO: 0.3 % (ref 0–1.1)
DEPRECATED RDW RBC AUTO: 40.9 FL (ref 37–49)
EOSINOPHIL # BLD AUTO: 0.13 10*3/MM3 (ref 0–0.36)
EOSINOPHIL NFR BLD AUTO: 2.1 % (ref 1–5)
ERYTHROCYTE [DISTWIDTH] IN BLOOD BY AUTOMATED COUNT: 12.4 % (ref 11.7–14.5)
HCT VFR BLD AUTO: 41.6 % (ref 40–49)
HGB BLD-MCNC: 14.6 G/DL (ref 13.5–16.5)
IMM GRANULOCYTES # BLD: 0.02 10*3/MM3 (ref 0–0.03)
IMM GRANULOCYTES NFR BLD: 0.3 % (ref 0–0.5)
LYMPHOCYTES # BLD AUTO: 2.24 10*3/MM3 (ref 1–3.5)
LYMPHOCYTES NFR BLD AUTO: 36.4 % (ref 20–49)
MCH RBC QN AUTO: 31.5 PG (ref 27–33)
MCHC RBC AUTO-ENTMCNC: 35.1 G/DL (ref 32–35)
MCV RBC AUTO: 89.7 FL (ref 83–97)
MONOCYTES # BLD AUTO: 0.51 10*3/MM3 (ref 0.25–0.8)
MONOCYTES NFR BLD AUTO: 8.3 % (ref 4–12)
NEUTROPHILS # BLD AUTO: 3.24 10*3/MM3 (ref 1.5–7)
NEUTROPHILS NFR BLD AUTO: 52.6 % (ref 39–75)
NRBC BLD MANUAL-RTO: 0 /100 WBC (ref 0–0)
PLATELET # BLD AUTO: 224 10*3/MM3 (ref 150–375)
PMV BLD AUTO: 8.5 FL (ref 8.9–12.1)
RBC # BLD AUTO: 4.64 10*6/MM3 (ref 4.3–5.5)
WBC NRBC COR # BLD: 6.16 10*3/MM3 (ref 4–10)

## 2018-09-21 PROCEDURE — 36415 COLL VENOUS BLD VENIPUNCTURE: CPT | Performed by: INTERNAL MEDICINE

## 2018-09-21 PROCEDURE — 85025 COMPLETE CBC W/AUTO DIFF WBC: CPT | Performed by: INTERNAL MEDICINE

## 2018-09-22 LAB
AFP-TM SERPL-MCNC: 3.2 NG/ML (ref 0–8.3)
HCG INTACT+B SERPL-ACNC: <1 MIU/ML (ref 0–3)

## 2018-12-03 ENCOUNTER — HOSPITAL ENCOUNTER (OUTPATIENT)
Dept: PET IMAGING | Facility: HOSPITAL | Age: 53
Discharge: HOME | End: 2018-12-03
Attending: INTERNAL MEDICINE | Admitting: INTERNAL MEDICINE

## 2018-12-03 ENCOUNTER — HOSPITAL ENCOUNTER (OUTPATIENT)
Dept: PET IMAGING | Facility: HOSPITAL | Age: 53
Discharge: HOME | End: 2018-12-03
Attending: INTERNAL MEDICINE

## 2018-12-03 DIAGNOSIS — D48.9 TERATOMA: ICD-10-CM

## 2018-12-03 LAB — GLUCOSE BLDC GLUCOMTR-MCNC: 112 MG/DL (ref 70–130)

## 2018-12-03 PROCEDURE — 78815 PET IMAGE W/CT SKULL-THIGH: CPT

## 2018-12-03 PROCEDURE — 82962 GLUCOSE BLOOD TEST: CPT

## 2018-12-03 PROCEDURE — A9552 F18 FDG: HCPCS | Performed by: INTERNAL MEDICINE

## 2018-12-03 PROCEDURE — 0 FLUDEOXYGLUCOSE F18 SOLUTION: Performed by: INTERNAL MEDICINE

## 2018-12-03 RX ADMIN — FLUDEOXYGLUCOSE F18 1 DOSE: 300 INJECTION INTRAVENOUS at 09:43

## 2018-12-11 ENCOUNTER — LAB (OUTPATIENT)
Dept: LAB | Facility: HOSPITAL | Age: 53
End: 2018-12-11

## 2018-12-11 ENCOUNTER — OFFICE VISIT (OUTPATIENT)
Dept: ONCOLOGY | Facility: CLINIC | Age: 53
End: 2018-12-11

## 2018-12-11 VITALS
HEIGHT: 69 IN | WEIGHT: 174.9 LBS | TEMPERATURE: 98 F | HEART RATE: 89 BPM | DIASTOLIC BLOOD PRESSURE: 70 MMHG | OXYGEN SATURATION: 97 % | SYSTOLIC BLOOD PRESSURE: 112 MMHG | BODY MASS INDEX: 25.9 KG/M2 | RESPIRATION RATE: 16 BRPM

## 2018-12-11 DIAGNOSIS — C71.9 PNET (PRIMITIVE NEUROECTODERMAL TUMOR) (HCC): Primary | ICD-10-CM

## 2018-12-11 DIAGNOSIS — D48.9 TERATOMA: ICD-10-CM

## 2018-12-11 LAB
BASOPHILS # BLD AUTO: 0.03 10*3/MM3 (ref 0–0.1)
BASOPHILS NFR BLD AUTO: 0.5 % (ref 0–1.1)
DEPRECATED RDW RBC AUTO: 38.4 FL (ref 37–49)
EOSINOPHIL # BLD AUTO: 0.11 10*3/MM3 (ref 0–0.36)
EOSINOPHIL NFR BLD AUTO: 1.7 % (ref 1–5)
ERYTHROCYTE [DISTWIDTH] IN BLOOD BY AUTOMATED COUNT: 11.9 % (ref 11.7–14.5)
HCT VFR BLD AUTO: 42.5 % (ref 40–49)
HGB BLD-MCNC: 14.9 G/DL (ref 13.5–16.5)
IMM GRANULOCYTES # BLD: 0.01 10*3/MM3 (ref 0–0.03)
IMM GRANULOCYTES NFR BLD: 0.2 % (ref 0–0.5)
LYMPHOCYTES # BLD AUTO: 2.37 10*3/MM3 (ref 1–3.5)
LYMPHOCYTES NFR BLD AUTO: 37.6 % (ref 20–49)
MCH RBC QN AUTO: 31.1 PG (ref 27–33)
MCHC RBC AUTO-ENTMCNC: 35.1 G/DL (ref 32–35)
MCV RBC AUTO: 88.7 FL (ref 83–97)
MONOCYTES # BLD AUTO: 0.6 10*3/MM3 (ref 0.25–0.8)
MONOCYTES NFR BLD AUTO: 9.5 % (ref 4–12)
NEUTROPHILS # BLD AUTO: 3.18 10*3/MM3 (ref 1.5–7)
NEUTROPHILS NFR BLD AUTO: 50.5 % (ref 39–75)
NRBC BLD MANUAL-RTO: 0 /100 WBC (ref 0–0)
PLATELET # BLD AUTO: 246 10*3/MM3 (ref 150–375)
PMV BLD AUTO: 8.7 FL (ref 8.9–12.1)
RBC # BLD AUTO: 4.79 10*6/MM3 (ref 4.3–5.5)
WBC NRBC COR # BLD: 6.3 10*3/MM3 (ref 4–10)

## 2018-12-11 PROCEDURE — 85025 COMPLETE CBC W/AUTO DIFF WBC: CPT | Performed by: INTERNAL MEDICINE

## 2018-12-11 PROCEDURE — 99214 OFFICE O/P EST MOD 30 MIN: CPT | Performed by: INTERNAL MEDICINE

## 2018-12-11 PROCEDURE — 36415 COLL VENOUS BLD VENIPUNCTURE: CPT | Performed by: INTERNAL MEDICINE

## 2018-12-11 NOTE — PROGRESS NOTES
Subjective     REASON FOR FOLLOWUP:    1. 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  Relative lymphocytosis negative flow cytometry  3.  Close follow-up planned per recommendation of Dr. Gonzalez at [facility]  4.    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  He comes in today for repeat scan and he is doing very well.      PET scan was done on this occasion because of severe allergy to contrast dye after his last CAT scan and thankfully this shows no signs of residual disease.  Alpha-fetoprotein and beta-hCG are pending     He has noted no testicular swelling-his ultrasound last year and was negative    He is been to the dermatologist to show him the mole that I wanted checked out and they're keeping a close eye on this    Past Medical History:   Diagnosis Date   • Anxiety    • H/O Mediastinal mass    • History of colon polyps    • Hyperlipidemia    • Low iron    • PNET (primitive neuroectodermal tumor) (CMS/HCC) 2017        Past Surgical History:   Procedure Laterality Date   • COLONOSCOPY     • ENDOSCOPY     • FOOT SURGERY      right   • KNEE ARTHROSCOPY      right   • LASIK     • MEDIASTINAL MASS EXCISION  01/12/2017    Dr Hernández   • PERICARDIECTOMY  01/12/2017    Dr Hernández   • VASECTOMY        Oncologic history   patient is a 51-year-old male who presented to his family doctor in September 2015 with a cough that developed while he was lying on his back.  Patient was felt to have allergies and given some medications for  this but the symptoms don't improve.  He was then referred to ENT who evaluated him and scoped him and thought he had reflux symptoms and gave her medications for this.  Patient still do not get better once back to see the ENT doctor was CAT scan the sinuses and thought he may have some sinus problems.  He was then referred to an allergist who diagnosed some allergies and gave her medications again with no significant improvement.  He is treated for asthma and again did not improve.  Finally when his symptoms worsened a chest CT was done the end of December which showed a 16 x 10 x 11 cm anterior mediastinal mass with no parenchymal nodules or adenopathy in the chest or abdomen.  Patient saw Dr. Hernández who ordered beta hCG and alpha-fetoprotein which were normal and then proceeded with a resection of the tumor which was done on 1/12/17 .    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  The tumor was lifted off the great vessels and apparently was completely resected in toto.  The cut surface of the tumor showed multiple cysts with very little solid tissue present between cystic lesions in numerous areas of possible calcification was identified .  The slides were reviewed at [facility] who confirmed the diagnosis.    postop really the patient has done very well and his breathing and discomfort have dramatically improved and he stopped most of his inhalers and asthma medications.  He's had no weight loss headache or other systemic complaints.  The patient reports chronic back pain due to an injury to L4-L5 and S1 many years ago and some frequency of urination at night but otherwise feels very  well and actually moved into a new home 2 weeks before his surgery.     He was sent to [facility] see Dr. Taj Gonzalez for an opinion regarding adjuvant treatment and Dr. Gonzalez reviewed his case and recommended close follow-up with CAT scans every 2 months for the first 6 months and then less frequently thereafter with plans for re-surgery if there is abnormality detected and then adjuvant treatment with CAV alternating with ifosfamide and etoposide.  Flow cytometry of his peripheral blood was unremarkable    5/17  patient is a 51-year-old male with recent diagnosis of a mediastinal germ cell tumor which is predominately teratoma with a focus of primitive neuroectodermal tumor.  He was sent to [facility] see Dr. Taj Gonzalez for an opinion regarding adjuvant treatment and Dr. Gonzalez reviewed his case and recommended close follow-up with CAT scans every 2 months for the first 6 months and then less frequently thereafter with plans for re-surgery if there is abnormality detected and then adjuvant treatment with CAV alternating with ifosfamide and etoposide.  Con is back to review his CAT scans and overall is feeling well.  He is exercising but not able to lose weight and is actually gained a little weight which she surprised by.  His white count shows a 22% eosinophilia which is new but he tells me he is resumed his allergy shots last month and this may explain it.  He has no other symptoms at this time  I reviewed his CAT scans and they show no change with some insignificant mediastinal nodes and a small lung nodule that was present prior to his original surgery and 2 hemangiomas in the liver that are stable.  AFP and beta hCG 2 months ago were normal and results are pending today.  7/17  Scans negative patient seen by the nurse practitioner in Dr. Cardoso's absence-tumor markers normal  9/17    His scans in July were fine but I was unavailable that day because of a medical emergency and the  nurse practitioner reviewed the scans.  He was complaining of tenderness in the scrotum and an ultrasound was done which was thankfully benign and the symptoms resolved.  Since then he has been to see Dr. Gonzalez and unfortunately due to our  new EMR notes are not routinely forwarded to referring doctors and I have made an effort to have the notes sent to Dr. Gonzalez today along with the disks from his scans and thankfully his CAT scan from last week also is completely benign.  His tumor markers are pending from today but have been negative so far.  Dr. Gonzalez has recommended moving to 6 months  imaging the patient is still very anxious about recurrence of his disease and I tried to reassure him. we'll get one more scan in 3-4 months before moving to 6 months scans.    Current Outpatient Medications on File Prior to Visit   Medication Sig Dispense Refill   • cetirizine (zyrTEC) 10 MG tablet Take 10 mg by mouth Daily.     • fluticasone (FLONASE) 50 MCG/ACT nasal spray 2 sprays into each nostril Daily.     • Multiple Vitamins-Minerals (MULTI COMPLETE PO) Take  by mouth daily.       No current facility-administered medications on file prior to visit.         ALLERGIES:    Allergies   Allergen Reactions   • Contrast Dye Hives     Pt had hives all over chest and itchy palms.    Pt was pre-medicated prior to this scan.   • Tape      Surgical tape        Social History     Socioeconomic History   • Marital status:      Spouse name: Chloé   • Number of children: Not on file   • Years of education: Not on file   • Highest education level: Not on file   Occupational History   • Occupation: Behavorial health     Employer: [employer]   Tobacco Use   • Smoking status: Never Smoker   • Smokeless tobacco: Never Used   Substance and Sexual Activity   • Alcohol use: Yes     Alcohol/week: 1.2 oz     Types: 1 Glasses of wine, 1 Cans of beer per week   • Drug use: No   • Sexual activity: Yes      "Partners: Female     Birth control/protection: Surgical        Family History   Problem Relation Age of Onset   • Heart failure Father    • Hypertension Father    • Cancer Maternal Aunt     parents are in their 70s and healthy his one sister is healthy and 3 children are healthy maternal aunt had an unusual cancer in the gallbladder in her 40s his paternal grandmother had malignancy in her 40s of unknown type    Review of Systems   Constitutional: Negative.  Negative for fatigue.   HENT: Negative for mouth sores and sore throat.    Respiratory: Negative for chest tightness and shortness of breath.    Cardiovascular: Negative for chest pain.   Gastrointestinal: Negative for abdominal pain, constipation, diarrhea, nausea and vomiting.   Genitourinary: Negative for difficulty urinating.   Musculoskeletal: Positive for gait problem (knee pain 12/11/18). Negative for back pain.   Neurological: Positive for headaches (pain shooting from neck to forehead 12/11/18). Negative for dizziness.   Psychiatric/Behavioral: Negative for sleep disturbance. The patient is not nervous/anxious.    All other systems reviewed and are negative.       Objective     Vitals:    12/11/18 1555   BP: 112/70   Pulse: 89   Resp: 16   Temp: 98 °F (36.7 °C)   SpO2: 97%   Weight: 79.3 kg (174 lb 14.4 oz)   Height: 176 cm (69.29\")   PainSc: 0-No pain     Current Status 12/11/2018   ECOG score 0       Physical Exam    GENERAL:  Well-developed, well-nourished in no acute distress.   SKIN:  Warm, dry without rashes, purpura or petechiae.Numerous atypical nevi noted   EYES:  Pupils equal, round and reactive to light.  EOMs intact.  Conjunctivae normal.  EARS:  Hearing intact.  NOSE:  Septum midline.  No excoriations or nasal discharge.  MOUTH:  Tongue is well-papillated; no stomatitis or ulcers.  Lips normal.  THROAT:  Oropharynx without lesions or exudates.  NECK:  Supple with good range of motion; no thyromegaly or masses, no JVD.  LYMPHATICS:  No " cervical, supraclavicular, axillary or inguinal adenopathy.  CHEST:  Lungs clear to auscultation. Good airflow.  Sternotomy incision is well-healed  CARDIAC:  Regular rate and rhythm without murmurs, rubs or gallops. Normal S1,S2.  ABDOMEN:  Soft, nontender with no hepatosplenomegaly or masses.  EXTREMITIES:  No clubbing, cyanosis or edema.  NEUROLOGICAL:  Cranial Nerves II-XII grossly intact.  No focal neurological deficits.  PSYCHIATRIC:  Normal affect and mood.        RECENT LABS:  Hematology WBC   Date Value Ref Range Status   12/11/2018 6.30 4.00 - 10.00 10*3/mm3 Final     RBC   Date Value Ref Range Status   12/11/2018 4.79 4.30 - 5.50 10*6/mm3 Final     Hemoglobin   Date Value Ref Range Status   12/11/2018 14.9 13.5 - 16.5 g/dL Final     Hematocrit   Date Value Ref Range Status   12/11/2018 42.5 40.0 - 49.0 % Final     Platelets   Date Value Ref Range Status   12/11/2018 246 150 - 375 10*3/mm3 Final        AFP and beta hCG are normal in 6/18        SCROTAL SONOGRAPHY WITH TESTICULAR DOPPLER     FINDINGS: Sonographic evaluation of the scrotal contents was performed.  The right testicle measures 4.5 x 2.6 x 2 cm and the left testicle  measures 4.2 x 3.2 x 1.9 cm. There are punctate echogenic foci seen in  both testes on the order of 2-3 in number. This is consistent with  minimal bilateral testicular microlithiasis. Normal vascularity is seen  within both testes. There is a 0.7 cm right epididymal head cyst versus  hematocele. A 1.6 cm left epididymal head cyst versus spermatocele is  also noted. No other abnormality is appreciated.     IMPRESSION:  1. Minimal bilateral testicular microlithiasis with approximately 2-3  punctate calcifications seen in both testes. The relative paucity of  microcalcifications argue against any active abnormality at this time.  However, follow-up scrotal sonography in one year is recommended.  2. Bilateral epididymal head cysts versus spermatoceles as described.   This report was  finalized on 7/23/2018 9    F-18 FDG PET FROM SKULL BASE TO MID THIGH WITH PET/CT FUSION  FINDINGS: There is no suspicious hypermetabolic activity within the  mediastinum or chest. There is no suspicious hypermetabolic activity  within the neck. There is a fairly typical speckled pattern of activity  throughout the liver. There is no suspicious hypermetabolic activity  within the abdomen or pelvis.     IMPRESSION:  There is no evidence for recurrence or metastatic disease  within the mediastinum and there is no evidence for distant metastases.     This report was finalized on 12/4/2018           Assessment/Plan     1.  Mediastinal germ cell tumor-  Completely resected 1/17 with normal preop alpha fetoprotein beta-hCG and a focus of primitive neuroectodermal tumor in the resected specimen which is predominantly mature teratoma  · Negative PET scan as of 12/18 2 years from diagnosis  2.  Eosinophilia probably due to her allergy shots -resolved  3.  Atypical nevi evaluated by dermatology   4.  .  Tubular adenoma on colonoscopy in [id number] in 3 years  5  Significant allergic rash to IV contrast in 6/18 while on steroids-no further IV contrast as far as possible    Plan  1.  return in 6  months with  afp /bhcg in 3 months and one week before  2.  Testicular ultrasound annually due in July 2019  3.  His next scan will move to one year per Dr. Gonzalez's recommendation

## 2018-12-12 LAB
AFP-TM SERPL-MCNC: 2.8 NG/ML (ref 0–8.3)
HCG INTACT+B SERPL-ACNC: <1 MIU/ML (ref 0–3)

## 2019-03-05 ENCOUNTER — LAB (OUTPATIENT)
Dept: LAB | Facility: HOSPITAL | Age: 54
End: 2019-03-05

## 2019-03-05 DIAGNOSIS — C71.9 PNET (PRIMITIVE NEUROECTODERMAL TUMOR) (HCC): ICD-10-CM

## 2019-03-05 PROCEDURE — 36415 COLL VENOUS BLD VENIPUNCTURE: CPT | Performed by: INTERNAL MEDICINE

## 2019-03-06 LAB
AFP-TM SERPL-MCNC: 2.6 NG/ML (ref 0–8.3)
HCG INTACT+B SERPL-ACNC: <1 MIU/ML (ref 0–3)

## 2019-06-04 ENCOUNTER — OFFICE VISIT (OUTPATIENT)
Dept: ONCOLOGY | Facility: CLINIC | Age: 54
End: 2019-06-04

## 2019-06-04 ENCOUNTER — LAB (OUTPATIENT)
Dept: LAB | Facility: HOSPITAL | Age: 54
End: 2019-06-04

## 2019-06-04 VITALS
OXYGEN SATURATION: 98 % | RESPIRATION RATE: 16 BRPM | HEART RATE: 70 BPM | DIASTOLIC BLOOD PRESSURE: 73 MMHG | TEMPERATURE: 98.1 F | BODY MASS INDEX: 25.18 KG/M2 | SYSTOLIC BLOOD PRESSURE: 118 MMHG | WEIGHT: 170 LBS | HEIGHT: 69 IN

## 2019-06-04 DIAGNOSIS — C71.9 PNET (PRIMITIVE NEUROECTODERMAL TUMOR) (HCC): ICD-10-CM

## 2019-06-04 DIAGNOSIS — C71.9 PNET (PRIMITIVE NEUROECTODERMAL TUMOR) (HCC): Primary | ICD-10-CM

## 2019-06-04 LAB
ALBUMIN SERPL-MCNC: 4.4 G/DL (ref 3.5–5.2)
ALBUMIN/GLOB SERPL: 1.8 G/DL (ref 1.1–2.4)
ALP SERPL-CCNC: 84 U/L (ref 38–116)
ALPHA-FETOPROTEIN: 2.29 NG/ML (ref 0–8.3)
ALT SERPL W P-5'-P-CCNC: 19 U/L (ref 0–41)
ANION GAP SERPL CALCULATED.3IONS-SCNC: 10.9 MMOL/L
AST SERPL-CCNC: 20 U/L (ref 0–40)
BASOPHILS # BLD AUTO: 0.06 10*3/MM3 (ref 0–0.2)
BASOPHILS NFR BLD AUTO: 0.9 % (ref 0–1.5)
BILIRUB SERPL-MCNC: 0.4 MG/DL (ref 0.2–1.2)
BUN BLD-MCNC: 15 MG/DL (ref 6–20)
BUN/CREAT SERPL: 14.4 (ref 7.3–30)
CALCIUM SPEC-SCNC: 9.3 MG/DL (ref 8.5–10.2)
CHLORIDE SERPL-SCNC: 103 MMOL/L (ref 98–107)
CO2 SERPL-SCNC: 26.1 MMOL/L (ref 22–29)
CREAT BLD-MCNC: 1.04 MG/DL (ref 0.7–1.3)
DEPRECATED RDW RBC AUTO: 40.6 FL (ref 37–54)
EOSINOPHIL # BLD AUTO: 0.36 10*3/MM3 (ref 0–0.4)
EOSINOPHIL NFR BLD AUTO: 5.5 % (ref 0.3–6.2)
ERYTHROCYTE [DISTWIDTH] IN BLOOD BY AUTOMATED COUNT: 12.4 % (ref 12.3–15.4)
GFR SERPL CREATININE-BSD FRML MDRD: 74 ML/MIN/1.73
GLOBULIN UR ELPH-MCNC: 2.4 GM/DL (ref 1.8–3.5)
GLUCOSE BLD-MCNC: 100 MG/DL (ref 74–124)
HCT VFR BLD AUTO: 41 % (ref 37.5–51)
HGB BLD-MCNC: 14.3 G/DL (ref 13–17.7)
IMM GRANULOCYTES # BLD AUTO: 0.02 10*3/MM3 (ref 0–0.05)
IMM GRANULOCYTES NFR BLD AUTO: 0.3 % (ref 0–0.5)
LYMPHOCYTES # BLD AUTO: 2.23 10*3/MM3 (ref 0.7–3.1)
LYMPHOCYTES NFR BLD AUTO: 33.8 % (ref 19.6–45.3)
MCH RBC QN AUTO: 31.2 PG (ref 26.6–33)
MCHC RBC AUTO-ENTMCNC: 34.9 G/DL (ref 31.5–35.7)
MCV RBC AUTO: 89.3 FL (ref 79–97)
MONOCYTES # BLD AUTO: 0.56 10*3/MM3 (ref 0.1–0.9)
MONOCYTES NFR BLD AUTO: 8.5 % (ref 5–12)
NEUTROPHILS # BLD AUTO: 3.37 10*3/MM3 (ref 1.7–7)
NEUTROPHILS NFR BLD AUTO: 51 % (ref 42.7–76)
NRBC BLD AUTO-RTO: 0 /100 WBC (ref 0–0.2)
PLATELET # BLD AUTO: 221 10*3/MM3 (ref 140–450)
PMV BLD AUTO: 8.6 FL (ref 6–12)
POTASSIUM BLD-SCNC: 4.1 MMOL/L (ref 3.5–4.7)
PROT SERPL-MCNC: 6.8 G/DL (ref 6.3–8)
RBC # BLD AUTO: 4.59 10*6/MM3 (ref 4.14–5.8)
SODIUM BLD-SCNC: 140 MMOL/L (ref 134–145)
WBC NRBC COR # BLD: 6.6 10*3/MM3 (ref 3.4–10.8)

## 2019-06-04 PROCEDURE — 99214 OFFICE O/P EST MOD 30 MIN: CPT | Performed by: INTERNAL MEDICINE

## 2019-06-04 PROCEDURE — 36415 COLL VENOUS BLD VENIPUNCTURE: CPT | Performed by: INTERNAL MEDICINE

## 2019-06-04 PROCEDURE — 80053 COMPREHEN METABOLIC PANEL: CPT | Performed by: INTERNAL MEDICINE

## 2019-06-04 PROCEDURE — 82105 ALPHA-FETOPROTEIN SERUM: CPT | Performed by: INTERNAL MEDICINE

## 2019-06-04 PROCEDURE — 85025 COMPLETE CBC W/AUTO DIFF WBC: CPT | Performed by: INTERNAL MEDICINE

## 2019-06-04 RX ORDER — NABUMETONE 750 MG/1
750 TABLET, FILM COATED ORAL 2 TIMES DAILY
Refills: 0 | COMMUNITY
Start: 2019-02-25 | End: 2019-11-26

## 2019-06-04 NOTE — PROGRESS NOTES
Subjective     REASON FOR FOLLOWUP:    1. 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  Relative lymphocytosis negative flow cytometry  3.  Close follow-up planned per recommendation of Dr. Gonzalez at [facility]  4.    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  He comes in today for repeat scan and he is doing very well.      PET scan was done on this occasion because of severe allergy to contrast dye after his last CAT scan and thankfully this shows no signs of residual disease.  Alpha-fetoprotein and beta-hCG are normalAZZ    He has noted no testicular swelling-his ultrasound last year and was negative    He is been to the dermatologist to show him the mole that I wanted checked out and they're keeping a close eye on this    He is due for colonoscopy this year as he had polyps 3 years ago    We set him up for genetic testing because of his colon polyps is atypical nevi and his testicular cancer and some unusual family history    PET scan will be done in 6 months because of his severe contrast allergy    Past Medical History:   Diagnosis Date   • Anxiety    • H/O Mediastinal mass    • History of colon polyps    • Hyperlipidemia    • Low iron    • PNET (primitive neuroectodermal tumor) (CMS/HCC) 2017        Past Surgical History:   Procedure Laterality Date   • COLONOSCOPY     • COLONOSCOPY N/A 7/25/2016    Procedure: COLONOSCOPY INTO CECUM/TERMINAL ILEUM WITH POLYPECTOMY;  Surgeon: Jarvis Garcia MD;  Location: [facility]  ENDOSCOPY;  Service:    • ENDOSCOPY     • ENDOSCOPY N/A 7/25/2016    Procedure: ESOPHAGOGASTRODUODENOSCOPY WITH DILATATION;  Surgeon: Jarvis Garcia MD;  Location: [facility] ENDOSCOPY;  Service:    • FOOT SURGERY      right   • KNEE ARTHROSCOPY      right   • LASIK     • MEDIASTINAL MASS EXCISION  01/12/2017    Dr Hernández   • PERICARDIECTOMY  01/12/2017    Dr Hernández   • VASECTOMY        Oncologic history   patient is a 51-year-old male who presented to his family doctor in September 2015 with a cough that developed while he was lying on his back.  Patient was felt to have allergies and given some medications for this but the symptoms don't improve.  He was then referred to ENT who evaluated him and scoped him and thought he had reflux symptoms and gave her medications for this.  Patient still do not get better once back to see the ENT doctor was CAT scan the sinuses and thought he may have some sinus problems.  He was then referred to an allergist who diagnosed some allergies and gave her medications again with no significant improvement.  He is treated for asthma and again did not improve.  Finally when his symptoms worsened a chest CT was done the end of December which showed a 16 x 10 x 11 cm anterior mediastinal mass with no parenchymal nodules or adenopathy in the chest or abdomen.  Patient saw Dr. Hernández who ordered beta hCG and alpha-fetoprotein which were normal and then proceeded with a resection of the tumor which was done on 1/12/17 .    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  The tumor was lifted off the great vessels and apparently was completely resected in toto.  The cut surface of  the tumor showed multiple cysts with very little solid tissue present between cystic lesions in numerous areas of possible calcification was identified .  The slides were reviewed at [facility] who confirmed the diagnosis.    postop really the patient has done very well and his breathing and discomfort have dramatically improved and he stopped most of his inhalers and asthma medications.  He's had no weight loss headache or other systemic complaints.  The patient reports chronic back pain due to an injury to L4-L5 and S1 many years ago and some frequency of urination at night but otherwise feels very well and actually moved into a new home 2 weeks before his surgery.     He was sent to [facility] see Dr. Taj Gonzalez for an opinion regarding adjuvant treatment and Dr. Gonzalez reviewed his case and recommended close follow-up with CAT scans every 2 months for the first 6 months and then less frequently thereafter with plans for re-surgery if there is abnormality detected and then adjuvant treatment with CAV alternating with ifosfamide and etoposide.  Flow cytometry of his peripheral blood was unremarkable    5/17  patient is a 51-year-old male with recent diagnosis of a mediastinal germ cell tumor which is predominately teratoma with a focus of primitive neuroectodermal tumor.  He was sent to [facility] see Dr. Taj Gonzalez for an opinion regarding adjuvant treatment and Dr. Gonzalez reviewed his case and recommended close follow-up with CAT scans every 2 months for the first 6 months and then less frequently thereafter with plans for re-surgery if there is abnormality detected and then adjuvant treatment with CAV alternating with ifosfamide and etoposide.  Con is back to review his CAT scans and overall is feeling well.  He is exercising but not able to lose weight and is actually gained a little weight which she surprised by.  His white count shows a 22% eosinophilia which is new but he  tells me he is resumed his allergy shots last month and this may explain it.  He has no other symptoms at this time  I reviewed his CAT scans and they show no change with some insignificant mediastinal nodes and a small lung nodule that was present prior to his original surgery and 2 hemangiomas in the liver that are stable.  AFP and beta hCG 2 months ago were normal and results are pending today.  7/17  Scans negative patient seen by the nurse practitioner in Dr. Cardoso's absence-tumor markers normal  9/17    His scans in July were fine but I was unavailable that day because of a medical emergency and the nurse practitioner reviewed the scans.  He was complaining of tenderness in the scrotum and an ultrasound was done which was thankfully benign and the symptoms resolved.  Since then he has been to see Dr. Gonzalez and unfortunately due to our  new EMR notes are not routinely forwarded to referring doctors and I have made an effort to have the notes sent to Dr. Gonzalez today along with the disks from his scans and thankfully his CAT scan from last week also is completely benign.  His tumor markers are pending from today but have been negative so far.  Dr. Gonzalez has recommended moving to 6 months  imaging the patient is still very anxious about recurrence of his disease and I tried to reassure him. we'll get one more scan in 3-4 months before moving to 6 months scans.    Current Outpatient Medications on File Prior to Visit   Medication Sig Dispense Refill   • cetirizine (zyrTEC) 10 MG tablet Take 10 mg by mouth Daily.     • fluticasone (FLONASE) 50 MCG/ACT nasal spray 2 sprays into each nostril Daily.     • Multiple Vitamins-Minerals (MULTI COMPLETE PO) Take  by mouth daily.     • nabumetone (RELAFEN) 750 MG tablet Take 750 mg by mouth 2 (Two) Times a Day.  0     No current facility-administered medications on file prior to visit.         ALLERGIES:    Allergies   Allergen Reactions   • Contrast Dye Hives     Pt  had hives all over chest and itchy palms.    Pt was pre-medicated prior to this scan.   • Tape      Surgical tape        Social History     Socioeconomic History   • Marital status:      Spouse name: Chloé   • Number of children: Not on file   • Years of education: Not on file   • Highest education level: Not on file   Occupational History   • Occupation: Behavorial health     Employer: [employer]   Tobacco Use   • Smoking status: Never Smoker   • Smokeless tobacco: Never Used   Substance and Sexual Activity   • Alcohol use: Yes     Alcohol/week: 1.2 oz     Types: 1 Glasses of wine, 1 Cans of beer per week   • Drug use: No   • Sexual activity: Yes     Partners: Female     Birth control/protection: Surgical        Family History   Problem Relation Age of Onset   • Heart failure Father    • Hypertension Father    • Cancer Maternal Aunt     parents are in their 70s and healthy his one sister is healthy and 3 children are healthy maternal aunt had an unusual cancer in the gallbladder in her 40s his paternal grandmother had malignancy in her 40s of unknown type    Review of Systems   Constitutional: Negative.  Negative for fatigue.   HENT: Negative for mouth sores and sore throat.    Respiratory: Negative for chest tightness and shortness of breath.    Cardiovascular: Negative for chest pain.   Gastrointestinal: Negative for abdominal pain, constipation, diarrhea, nausea and vomiting.   Genitourinary: Negative for difficulty urinating.   Musculoskeletal: Positive for gait problem (knee pain 12/11/18). Negative for back pain.   Neurological: Positive for headaches (pain shooting from neck to forehead 12/11/18). Negative for dizziness.   Psychiatric/Behavioral: Negative for sleep disturbance. The patient is not nervous/anxious.    All other systems reviewed and are negative.       Objective     Vitals:    06/04/19 1200   BP: 118/73   Pulse: 70   Resp: 16   Temp: 98.1 °F (36.7 °C)   TempSrc:  "Oral   SpO2: 98%   Weight: 77.1 kg (170 lb)   Height: 176 cm (69.29\")  Comment: new ht   PainSc: 0-No pain     Current Status 6/4/2019   ECOG score 0       Physical Exam    GENERAL:  Well-developed, well-nourished in no acute distress.   SKIN:  Warm, dry without rashes, purpura or petechiae.Numerous atypical nevi noted   EYES:  Pupils equal, round and reactive to light.  EOMs intact.  Conjunctivae normal.  EARS:  Hearing intact.  NOSE:  Septum midline.  No excoriations or nasal discharge.  MOUTH:  Tongue is well-papillated; no stomatitis or ulcers.  Lips normal.  THROAT:  Oropharynx without lesions or exudates.  NECK:  Supple with good range of motion; no thyromegaly or masses, no JVD.  LYMPHATICS:  No cervical, supraclavicular, axillary or inguinal adenopathy.  CHEST:  Lungs clear to auscultation. Good airflow.  Sternotomy incision is well-healed  CARDIAC:  Regular rate and rhythm without murmurs, rubs or gallops. Normal S1,S2.  ABDOMEN:  Soft, nontender with no hepatosplenomegaly or masses.  EXTREMITIES:  No clubbing, cyanosis or edema.  NEUROLOGICAL:  Cranial Nerves II-XII grossly intact.  No focal neurological deficits.  PSYCHIATRIC:  Normal affect and mood.        RECENT LABS:  Hematology WBC   Date Value Ref Range Status   06/04/2019 6.60 3.40 - 10.80 10*3/mm3 Final     RBC   Date Value Ref Range Status   06/04/2019 4.59 4.14 - 5.80 10*6/mm3 Final     Hemoglobin   Date Value Ref Range Status   06/04/2019 14.3 13.0 - 17.7 g/dL Final     Hematocrit   Date Value Ref Range Status   06/04/2019 41.0 37.5 - 51.0 % Final     Platelets   Date Value Ref Range Status   06/04/2019 221 140 - 450 10*3/mm3 Final        AFP and beta hCG are normal in 12/18        SCROTAL SONOGRAPHY WITH TESTICULAR DOPPLER     FINDINGS: Sonographic evaluation of the scrotal contents was performed.  The right testicle measures 4.5 x 2.6 x 2 cm and the left testicle  measures 4.2 x 3.2 x 1.9 cm. There are punctate echogenic foci seen in  both " testes on the order of 2-3 in number. This is consistent with  minimal bilateral testicular microlithiasis. Normal vascularity is seen  within both testes. There is a 0.7 cm right epididymal head cyst versus  hematocele. A 1.6 cm left epididymal head cyst versus spermatocele is  also noted. No other abnormality is appreciated.     IMPRESSION:  1. Minimal bilateral testicular microlithiasis with approximately 2-3  punctate calcifications seen in both testes. The relative paucity of  microcalcifications argue against any active abnormality at this time.  However, follow-up scrotal sonography in one year is recommended.  2. Bilateral epididymal head cysts versus spermatoceles as described.   This report was finalized on 7/23/2018 9    F-18 FDG PET FROM SKULL BASE TO MID THIGH WITH PET/CT FUSION  FINDINGS: There is no suspicious hypermetabolic activity within the  mediastinum or chest. There is no suspicious hypermetabolic activity  within the neck. There is a fairly typical speckled pattern of activity  throughout the liver. There is no suspicious hypermetabolic activity  within the abdomen or pelvis.     IMPRESSION:  There is no evidence for recurrence or metastatic disease  within the mediastinum and there is no evidence for distant metastases.     This report was finalized on 12/4/2018           Assessment/Plan     1.  Mediastinal germ cell tumor-  Completely resected 1/17 with normal preop alpha fetoprotein beta-hCG and a focus of primitive neuroectodermal tumor in the resected specimen which is predominantly mature teratoma  · Negative PET scan as of 12/18 2 years from diagnosis  2.  Eosinophilia probably due to her allergy shots -resolved  3.  Atypical nevi evaluated by dermatology   4.  .  Tubular adenoma on colonoscopy in [id number] in 3 years  5  Significant allergic rash to IV contrast in 6/18 while on steroids-no further IV contrast as far as possible    Plan  1.  return in 6  months with  afp /bhcg and  PET scan 1 week before  2.  Testicular ultrasound annually due in July 2019  3.  Colonoscopy and follow-up with dermatology  4.  Genetic testing

## 2019-06-05 LAB — HCG INTACT+B SERPL-ACNC: <1 MIU/ML (ref 0–3)

## 2019-07-16 ENCOUNTER — CLINICAL SUPPORT (OUTPATIENT)
Dept: OTHER | Facility: HOSPITAL | Age: 54
End: 2019-07-16

## 2019-07-16 DIAGNOSIS — C71.9 MALIGNANT NEOPLASM OF BRAIN, UNSPECIFIED LOCATION (HCC): Primary | ICD-10-CM

## 2019-07-16 PROCEDURE — G0463 HOSPITAL OUTPT CLINIC VISIT: HCPCS

## 2019-07-16 NOTE — PROGRESS NOTES
Patient seen by Dr. Villareal for genetic counseling and testing. Lab drawn with 21 gauge butterfly needle in the Left AC times 1 attempt. Lab sent to [email or web address]. Patient informed he would receive phone call with test results.

## 2019-07-29 ENCOUNTER — HOSPITAL ENCOUNTER (OUTPATIENT)
Dept: ULTRASOUND IMAGING | Facility: HOSPITAL | Age: 54
Discharge: HOME | End: 2019-07-29
Admitting: INTERNAL MEDICINE

## 2019-07-29 DIAGNOSIS — C71.9 PNET (PRIMITIVE NEUROECTODERMAL TUMOR) (HCC): ICD-10-CM

## 2019-07-29 PROCEDURE — 76870 US EXAM SCROTUM: CPT

## 2019-11-18 ENCOUNTER — HOSPITAL ENCOUNTER (OUTPATIENT)
Dept: PET IMAGING | Facility: HOSPITAL | Age: 54
Discharge: HOME | End: 2019-11-18
Admitting: INTERNAL MEDICINE

## 2019-11-18 ENCOUNTER — HOSPITAL ENCOUNTER (OUTPATIENT)
Dept: PET IMAGING | Facility: HOSPITAL | Age: 54
Discharge: HOME | End: 2019-11-18

## 2019-11-18 DIAGNOSIS — C71.9 PNET (PRIMITIVE NEUROECTODERMAL TUMOR) (HCC): ICD-10-CM

## 2019-11-18 LAB — GLUCOSE BLDC GLUCOMTR-MCNC: 97 MG/DL (ref 70–130)

## 2019-11-18 PROCEDURE — 82962 GLUCOSE BLOOD TEST: CPT

## 2019-11-18 PROCEDURE — A9552 F18 FDG: HCPCS | Performed by: INTERNAL MEDICINE

## 2019-11-18 PROCEDURE — 0 FLUDEOXYGLUCOSE F18 SOLUTION: Performed by: INTERNAL MEDICINE

## 2019-11-18 PROCEDURE — 78815 PET IMAGE W/CT SKULL-THIGH: CPT

## 2019-11-18 RX ADMIN — FLUDEOXYGLUCOSE F18 1 DOSE: 300 INJECTION INTRAVENOUS at 10:00

## 2019-11-26 ENCOUNTER — LAB (OUTPATIENT)
Dept: LAB | Facility: HOSPITAL | Age: 54
End: 2019-11-26

## 2019-11-26 ENCOUNTER — OFFICE VISIT (OUTPATIENT)
Dept: ONCOLOGY | Facility: CLINIC | Age: 54
End: 2019-11-26

## 2019-11-26 VITALS
RESPIRATION RATE: 16 BRPM | HEIGHT: 69 IN | TEMPERATURE: 98.1 F | OXYGEN SATURATION: 97 % | SYSTOLIC BLOOD PRESSURE: 109 MMHG | HEART RATE: 74 BPM | BODY MASS INDEX: 25.64 KG/M2 | DIASTOLIC BLOOD PRESSURE: 68 MMHG | WEIGHT: 173.1 LBS

## 2019-11-26 DIAGNOSIS — C71.9 PNET (PRIMITIVE NEUROECTODERMAL TUMOR) (HCC): Primary | ICD-10-CM

## 2019-11-26 DIAGNOSIS — J98.59 MEDIASTINAL MASS: ICD-10-CM

## 2019-11-26 DIAGNOSIS — C71.9 PNET (PRIMITIVE NEUROECTODERMAL TUMOR) (HCC): ICD-10-CM

## 2019-11-26 LAB
ALPHA-FETOPROTEIN: 3.62 NG/ML (ref 0–8.3)
BASOPHILS # BLD AUTO: 0.03 10*3/MM3 (ref 0–0.2)
BASOPHILS NFR BLD AUTO: 0.6 % (ref 0–1.5)
DEPRECATED RDW RBC AUTO: 39 FL (ref 37–54)
EOSINOPHIL # BLD AUTO: 0.17 10*3/MM3 (ref 0–0.4)
EOSINOPHIL NFR BLD AUTO: 3.2 % (ref 0.3–6.2)
ERYTHROCYTE [DISTWIDTH] IN BLOOD BY AUTOMATED COUNT: 11.9 % (ref 12.3–15.4)
HCT VFR BLD AUTO: 42 % (ref 37.5–51)
HGB BLD-MCNC: 14.4 G/DL (ref 13–17.7)
IMM GRANULOCYTES # BLD AUTO: 0.01 10*3/MM3 (ref 0–0.05)
IMM GRANULOCYTES NFR BLD AUTO: 0.2 % (ref 0–0.5)
LDH SERPL-CCNC: 148 U/L (ref 99–259)
LYMPHOCYTES # BLD AUTO: 2.13 10*3/MM3 (ref 0.7–3.1)
LYMPHOCYTES NFR BLD AUTO: 40.2 % (ref 19.6–45.3)
MCH RBC QN AUTO: 30.8 PG (ref 26.6–33)
MCHC RBC AUTO-ENTMCNC: 34.3 G/DL (ref 31.5–35.7)
MCV RBC AUTO: 89.9 FL (ref 79–97)
MONOCYTES # BLD AUTO: 0.46 10*3/MM3 (ref 0.1–0.9)
MONOCYTES NFR BLD AUTO: 8.7 % (ref 5–12)
NEUTROPHILS # BLD AUTO: 2.5 10*3/MM3 (ref 1.7–7)
NEUTROPHILS NFR BLD AUTO: 47.1 % (ref 42.7–76)
NRBC BLD AUTO-RTO: 0 /100 WBC (ref 0–0.2)
PLATELET # BLD AUTO: 219 10*3/MM3 (ref 140–450)
PMV BLD AUTO: 8.7 FL (ref 6–12)
RBC # BLD AUTO: 4.67 10*6/MM3 (ref 4.14–5.8)
WBC NRBC COR # BLD: 5.3 10*3/MM3 (ref 3.4–10.8)

## 2019-11-26 PROCEDURE — 99214 OFFICE O/P EST MOD 30 MIN: CPT | Performed by: INTERNAL MEDICINE

## 2019-11-26 PROCEDURE — 83615 LACTATE (LD) (LDH) ENZYME: CPT

## 2019-11-26 PROCEDURE — 82105 ALPHA-FETOPROTEIN SERUM: CPT | Performed by: INTERNAL MEDICINE

## 2019-11-26 PROCEDURE — 85025 COMPLETE CBC W/AUTO DIFF WBC: CPT

## 2019-11-26 PROCEDURE — 36415 COLL VENOUS BLD VENIPUNCTURE: CPT

## 2019-11-26 RX ORDER — DICLOFENAC SODIUM 75 MG/1
75 TABLET, DELAYED RELEASE ORAL 2 TIMES DAILY
Refills: 0 | COMMUNITY
Start: 2019-10-08 | End: 2021-01-07

## 2019-11-26 NOTE — PROGRESS NOTES
Subjective     REASON FOR FOLLOWUP:    1. 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  Relative lymphocytosis negative flow cytometry  3.  Close follow-up planned per recommendation of Dr. Gonzalez at [facility]  4.    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  He comes in today for repeat scan and he is doing very well.      PET scan was done again on this occasion because of severe allergy to contrast dye after his last CAT scan and thankfully this shows no signs of residual disease.  Alpha-fetoprotein and beta-hCG are pending    He has noted no testicular swelling-his ultrasound last year and was negative    He is been to the dermatologist to show him the mole that I wanted checked out and they're keeping a close eye on this    He is due for colonoscopy in 2 year as he had polyps 3 years ago    We set him up for genetic testing because of his colon polyps his atypical nevi and his testicular cancer and some unusual family history and it showed a V US in the kit gene but nothing else significant    PET scan will be done in 12 months because of his severe contrast allergy through year 4 and 5 per the recommendations of Dr. Gonzalez    Past Medical History:   Diagnosis Date   • Anxiety    • H/O Mediastinal mass    • History of colon polyps    • Hyperlipidemia    • Low iron    • PNET (primitive neuroectodermal tumor) (CMS/HCC) 2017        Past Surgical History:   Procedure Laterality Date   • COLONOSCOPY     • COLONOSCOPY N/A  7/25/2016    Procedure: COLONOSCOPY INTO CECUM/TERMINAL ILEUM WITH POLYPECTOMY;  Surgeon: Jarvis Garcia MD;  Location: [facility] ENDOSCOPY;  Service:    • ENDOSCOPY     • ENDOSCOPY N/A 7/25/2016    Procedure: ESOPHAGOGASTRODUODENOSCOPY WITH DILATATION;  Surgeon: Jarvis Garcia MD;  Location: [facility] ENDOSCOPY;  Service:    • FOOT SURGERY      right   • KNEE ARTHROSCOPY      right   • LASIK     • MEDIASTINAL MASS EXCISION  01/12/2017    Dr Hernández   • PERICARDIECTOMY  01/12/2017    Dr Hernández   • VASECTOMY        Oncologic history   patient is a 51-year-old male who presented to his family doctor in September 2015 with a cough that developed while he was lying on his back.  Patient was felt to have allergies and given some medications for this but the symptoms don't improve.  He was then referred to ENT who evaluated him and scoped him and thought he had reflux symptoms and gave her medications for this.  Patient still do not get better once back to see the ENT doctor was CAT scan the sinuses and thought he may have some sinus problems.  He was then referred to an allergist who diagnosed some allergies and gave her medications again with no significant improvement.  He is treated for asthma and again did not improve.  Finally when his symptoms worsened a chest CT was done the end of December which showed a 16 x 10 x 11 cm anterior mediastinal mass with no parenchymal nodules or adenopathy in the chest or abdomen.  Patient saw Dr. Hernández who ordered beta hCG and alpha-fetoprotein which were normal and then proceeded with a resection of the tumor which was done on 1/12/17 .    Path report showed specimen 1 (mediastinal biopsy )which had a benign mature fibroadipose tissue and CAD coronary epithelium with no malignancy specimen 2 (mediastinal biopsy )showed benign mature fibroadipose tissue with no malignancy and specimen 3 ( mediastinal mass) revealed malignant germ cell tumor with teratoma with focal primitive  neuroectodermal components.  The tumor was lifted off the great vessels and apparently was completely resected in toto.  The cut surface of the tumor showed multiple cysts with very little solid tissue present between cystic lesions in numerous areas of possible calcification was identified .  The slides were reviewed at [facility] who confirmed the diagnosis.    postop really the patient has done very well and his breathing and discomfort have dramatically improved and he stopped most of his inhalers and asthma medications.  He's had no weight loss headache or other systemic complaints.  The patient reports chronic back pain due to an injury to L4-L5 and S1 many years ago and some frequency of urination at night but otherwise feels very well and actually moved into a new home 2 weeks before his surgery.     He was sent to [facility] see Dr. Taj Gonzalez for an opinion regarding adjuvant treatment and Dr. Gonzalez reviewed his case and recommended close follow-up with CAT scans every 2 months for the first 6 months and then less frequently thereafter with plans for re-surgery if there is abnormality detected and then adjuvant treatment with CAV alternating with ifosfamide and etoposide.  Flow cytometry of his peripheral blood was unremarkable    5/17  patient is a 51-year-old male with recent diagnosis of a mediastinal germ cell tumor which is predominately teratoma with a focus of primitive neuroectodermal tumor.  He was sent to [facility] see Dr. Taj Gonzalez for an opinion regarding adjuvant treatment and Dr. Gonzalez reviewed his case and recommended close follow-up with CAT scans every 2 months for the first 6 months and then less frequently thereafter with plans for re-surgery if there is abnormality detected and then adjuvant treatment with CAV alternating with ifosfamide and etoposide.  Con is back to review his CAT scans and overall is feeling well.  He is exercising but not able to  lose weight and is actually gained a little weight which she surprised by.  His white count shows a 22% eosinophilia which is new but he tells me he is resumed his allergy shots last month and this may explain it.  He has no other symptoms at this time  I reviewed his CAT scans and they show no change with some insignificant mediastinal nodes and a small lung nodule that was present prior to his original surgery and 2 hemangiomas in the liver that are stable.  AFP and beta hCG 2 months ago were normal and results are pending today.  7/17  Scans negative patient seen by the nurse practitioner in Dr. Cardoso's absence-tumor markers normal  9/17    His scans in July were fine but I was unavailable that day because of a medical emergency and the nurse practitioner reviewed the scans.  He was complaining of tenderness in the scrotum and an ultrasound was done which was thankfully benign and the symptoms resolved.  Since then he has been to see Dr. Gonzalez and unfortunately due to our  new EMR notes are not routinely forwarded to referring doctors and I have made an effort to have the notes sent to Dr. Gonzalez today along with the disks from his scans and thankfully his CAT scan from last week also is completely benign.  His tumor markers are pending from today but have been negative so far.  Dr. Gonzalez has recommended moving to 6 months  imaging the patient is still very anxious about recurrence of his disease and I tried to reassure him. we'll get one more scan in 3-4 months before moving to 6 months scans.    Current Outpatient Medications on File Prior to Visit   Medication Sig Dispense Refill   • cetirizine (zyrTEC) 10 MG tablet Take 10 mg by mouth Daily.     • fluticasone (FLONASE) 50 MCG/ACT nasal spray 2 sprays into each nostril Daily.     • Multiple Vitamins-Minerals (MULTI COMPLETE PO) Take  by mouth daily.     • nabumetone (RELAFEN) 750 MG tablet Take 750 mg by mouth 2 (Two) Times a Day.  0     No current  facility-administered medications on file prior to visit.         ALLERGIES:    Allergies   Allergen Reactions   • Contrast Dye Hives     Pt had hives all over chest and itchy palms.    Pt was pre-medicated prior to this scan.   • Tape      Surgical tape        Social History     Socioeconomic History   • Marital status:      Spouse name: Chloé   • Number of children: Not on file   • Years of education: Not on file   • Highest education level: Not on file   Occupational History   • Occupation: Behavorial health     Employer: [employer]   Tobacco Use   • Smoking status: Never Smoker   • Smokeless tobacco: Never Used   Substance and Sexual Activity   • Alcohol use: Yes     Alcohol/week: 1.2 oz     Types: 1 Glasses of wine, 1 Cans of beer per week   • Drug use: No   • Sexual activity: Yes     Partners: Female     Birth control/protection: Surgical        Family History   Problem Relation Age of Onset   • Heart failure Father    • Hypertension Father    • Cancer Maternal Aunt     parents are in their 70s and healthy his one sister is healthy and 3 children are healthy maternal aunt had an unusual cancer in the gallbladder in her 40s his paternal grandmother had malignancy in her 40s of unknown type    Review of Systems   Constitutional: Negative.  Negative for fatigue.   HENT: Negative for mouth sores and sore throat.    Respiratory: Negative for chest tightness and shortness of breath.    Cardiovascular: Negative for chest pain.   Gastrointestinal: Negative for abdominal pain, constipation, diarrhea, nausea and vomiting.   Genitourinary: Negative for difficulty urinating.   Musculoskeletal: Positive for gait problem (knee pain same 11/26/19). Negative for back pain.   Neurological: Positive for headaches (pain shooting from neck to forehead better 11/26/19). Negative for dizziness.   Psychiatric/Behavioral: Negative for sleep disturbance. The patient is not nervous/anxious.    All other  systems reviewed and are negative.       Objective     There were no vitals filed for this visit.  Current Status 6/4/2019   ECOG score 0       Physical Exam    GENERAL:  Well-developed, well-nourished in no acute distress.   SKIN:  Warm, dry without rashes, purpura or petechiae.Numerous atypical nevi noted   EYES:  Pupils equal, round and reactive to light.  EOMs intact.  Conjunctivae normal.  EARS:  Hearing intact.  NOSE:  Septum midline.  No excoriations or nasal discharge.  MOUTH:  Tongue is well-papillated; no stomatitis or ulcers.  Lips normal.  THROAT:  Oropharynx without lesions or exudates.  NECK:  Supple with good range of motion; no thyromegaly or masses, no JVD.  LYMPHATICS:  No cervical, supraclavicular, axillary or inguinal adenopathy.  CHEST:  Lungs clear to auscultation. Good airflow.  Sternotomy incision is well-healed  CARDIAC:  Regular rate and rhythm without murmurs, rubs or gallops. Normal S1,S2.  ABDOMEN:  Soft, nontender with no hepatosplenomegaly or masses.  EXTREMITIES:  No clubbing, cyanosis or edema.  NEUROLOGICAL:  Cranial Nerves II-XII grossly intact.  No focal neurological deficits.  PSYCHIATRIC:  Normal affect and mood.        RECENT LABS:  Hematology WBC   Date Value Ref Range Status   06/04/2019 6.60 3.40 - 10.80 10*3/mm3 Final     RBC   Date Value Ref Range Status   06/04/2019 4.59 4.14 - 5.80 10*6/mm3 Final     Hemoglobin   Date Value Ref Range Status   06/04/2019 14.3 13.0 - 17.7 g/dL Final     Hematocrit   Date Value Ref Range Status   06/04/2019 41.0 37.5 - 51.0 % Final     Platelets   Date Value Ref Range Status   06/04/2019 221 140 - 450 10*3/mm3 Final        AFP and beta hCG are normal in 12/18        SCROTAL SONOGRAPHY WITH TESTICULAR DOPPLER     FINDINGS: Sonographic evaluation of the scrotal contents was performed.  The right testicle measures 4.5 x 2.6 x 2 cm and the left testicle  measures 4.2 x 3.2 x 1.9 cm. There are punctate echogenic foci seen in  both testes on the  order of 2-3 in number. This is consistent with  minimal bilateral testicular microlithiasis. Normal vascularity is seen  within both testes. There is a 0.7 cm right epididymal head cyst versus  hematocele. A 1.6 cm left epididymal head cyst versus spermatocele is  also noted. No other abnormality is appreciated.     IMPRESSION:  1. Minimal bilateral testicular microlithiasis with approximately 2-3  punctate calcifications seen in both testes. The relative paucity of  microcalcifications argue against any active abnormality at this time.  However, follow-up scrotal sonography in one year is recommended.  2. Bilateral epididymal head cysts versus spermatoceles as described.   This report was finalized on 7/23/2018 9    F-18 FDG PET FROM SKULL BASE TO MID THIGH WITH PET/CT FUSION  FINDINGS: There is no suspicious hypermetabolic activity within the  mediastinum or chest. There is no suspicious hypermetabolic activity  within the neck. There is a fairly typical speckled pattern of activity  throughout the liver. There is no suspicious hypermetabolic activity  within the abdomen or pelvis.     IMPRESSION:  There is no evidence for recurrence or metastatic disease  within the mediastinum and there is no evidence for distant metastases.     This report was finalized on 12/4/2018      Study Result     SCROTAL SONOGRAPHY WITH TESTICULAR DOPPLER      IMPRESSION:  1. Stable minimal bilateral testicular microlithiasis.  2. Stable bilateral epididymal head cyst versus spermatocele.   3. There is a small right hydrocele.     This report was finalized on 7/31/2019 11:58 AM by Dr. Howard Buchanan M.D.       Study Result     F-18 FDG PET FROM SKULL BASE TO MID THIGH WITH PET/CT FUSION   IMPRESSION:  1.  No findings of FDG avid disease in the neck, chest, abdomen or  pelvis. Postsurgical changes from anterior mediastinal mass resection  without findings of local recurrence.  2.  Stable hypodense lesions in the liver since 2017  measuring up to 2.5  cm.  3.  Other findings as above.     This report was finalized on 11/20/2019 7:30 AM by Dr. Yoseph Landin M.D.                   Assessment/Plan     1.  Mediastinal germ cell tumor-  Completely resected 1/17 with normal preop alpha fetoprotein beta-hCG and a focus of primitive neuroectodermal tumor in the resected specimen which is predominantly mature teratoma  · Negative PET scan as of 11/19- 3  years from diagnosis  2.  Eosinophilia probably due to  allergy shots -resolved  3.  Atypical nevi evaluated by dermatology   4.  .  Tubular adenoma on colonoscopy in [id number] in 3 years  5  Significant allergic rash to IV contrast in 6/18 while on steroids-no further IV contrast as far as possible  6.  Genetic testing negative except for a VUS in the KIT gene    Plan  1.  return in 6  months with  afp /bhcg and MD visit  2.  Testicular ultrasound annually due in July 2019  3.  Scans will be done annually for the next 2 years

## 2019-11-27 DIAGNOSIS — I42.9 CARDIOMYOPATHY, UNSPECIFIED TYPE (HCC): Primary | ICD-10-CM

## 2019-11-27 DIAGNOSIS — I51.9 DECREASED LEFT VENTRICULAR SYSTOLIC FUNCTION: ICD-10-CM

## 2019-11-27 DIAGNOSIS — R00.2 PALPITATIONS: ICD-10-CM

## 2019-11-27 DIAGNOSIS — R00.0 TACHYCARDIA: ICD-10-CM

## 2019-11-27 LAB — HCG INTACT+B SERPL-ACNC: <1 MIU/ML (ref 0–3)

## 2019-12-02 PROBLEM — I51.9 DECREASED LEFT VENTRICULAR SYSTOLIC FUNCTION: Status: ACTIVE | Noted: 2019-12-02

## 2019-12-31 ENCOUNTER — HOSPITAL ENCOUNTER (OUTPATIENT)
Dept: CARDIOLOGY | Facility: HOSPITAL | Age: 54
Discharge: HOME | End: 2019-12-31
Admitting: INTERNAL MEDICINE

## 2019-12-31 VITALS
BODY MASS INDEX: 24.44 KG/M2 | HEART RATE: 82 BPM | SYSTOLIC BLOOD PRESSURE: 115 MMHG | DIASTOLIC BLOOD PRESSURE: 84 MMHG | HEIGHT: 69 IN | WEIGHT: 165 LBS

## 2019-12-31 DIAGNOSIS — R00.0 TACHYCARDIA: ICD-10-CM

## 2019-12-31 DIAGNOSIS — R00.2 PALPITATIONS: ICD-10-CM

## 2019-12-31 DIAGNOSIS — I42.9 CARDIOMYOPATHY, UNSPECIFIED TYPE (HCC): ICD-10-CM

## 2019-12-31 PROCEDURE — 93306 TTE W/DOPPLER COMPLETE: CPT

## 2020-01-01 LAB
ASCENDING AORTA: 3 CM
BH CV ECHO MEAS - ACS: 2.2 CM
BH CV ECHO MEAS - AO MAX PG (FULL): 1.7 MMHG
BH CV ECHO MEAS - AO MAX PG: 4.3 MMHG
BH CV ECHO MEAS - AO MEAN PG (FULL): 1.3 MMHG
BH CV ECHO MEAS - AO MEAN PG: 2.6 MMHG
BH CV ECHO MEAS - AO ROOT AREA (BSA CORRECTED): 1.9
BH CV ECHO MEAS - AO ROOT AREA: 10 CM^2
BH CV ECHO MEAS - AO ROOT DIAM: 3.6 CM
BH CV ECHO MEAS - AO V2 MAX: 103.5 CM/SEC
BH CV ECHO MEAS - AO V2 MEAN: 78.2 CM/SEC
BH CV ECHO MEAS - AO V2 VTI: 20.9 CM
BH CV ECHO MEAS - ASC AORTA: 3 CM
BH CV ECHO MEAS - AVA(I,A): 3 CM^2
BH CV ECHO MEAS - AVA(I,D): 3 CM^2
BH CV ECHO MEAS - AVA(V,A): 3.1 CM^2
BH CV ECHO MEAS - AVA(V,D): 3.1 CM^2
BH CV ECHO MEAS - BSA(HAYCOCK): 1.9 M^2
BH CV ECHO MEAS - BSA: 1.9 M^2
BH CV ECHO MEAS - BZI_BMI: 24.4 KILOGRAMS/M^2
BH CV ECHO MEAS - BZI_METRIC_HEIGHT: 175.3 CM
BH CV ECHO MEAS - BZI_METRIC_WEIGHT: 74.8 KG
BH CV ECHO MEAS - EDV(CUBED): 97.7 ML
BH CV ECHO MEAS - EDV(MOD-SP2): 101.9 ML
BH CV ECHO MEAS - EDV(MOD-SP4): 99.4 ML
BH CV ECHO MEAS - EDV(TEICH): 97.6 ML
BH CV ECHO MEAS - EF(CUBED): 53.7 %
BH CV ECHO MEAS - EF(MOD-BP): 46 %
BH CV ECHO MEAS - EF(MOD-SP2): 45.8 %
BH CV ECHO MEAS - EF(MOD-SP4): 41.1 %
BH CV ECHO MEAS - EF(TEICH): 45.6 %
BH CV ECHO MEAS - ESV(CUBED): 45.3 ML
BH CV ECHO MEAS - ESV(MOD-SP2): 55.3 ML
BH CV ECHO MEAS - ESV(MOD-SP4): 58.5 ML
BH CV ECHO MEAS - ESV(TEICH): 53.2 ML
BH CV ECHO MEAS - FS: 22.6 %
BH CV ECHO MEAS - IVS/LVPW: 1.2
BH CV ECHO MEAS - IVSD: 0.97 CM
BH CV ECHO MEAS - LA DIMENSION(2D): 3.4 CM
BH CV ECHO MEAS - LA DIMENSION: 3.4 CM
BH CV ECHO MEAS - LA/AO: 0.96
BH CV ECHO MEAS - LAT PEAK E' VEL: 11 CM/SEC
BH CV ECHO MEAS - LV DIASTOLIC VOL/BSA (35-75): 52.2 ML/M^2
BH CV ECHO MEAS - LV IVRT: 0.1 SEC
BH CV ECHO MEAS - LV MASS(C)D: 133.1 GRAMS
BH CV ECHO MEAS - LV MASS(C)DI: 69.9 GRAMS/M^2
BH CV ECHO MEAS - LV MAX PG: 2.6 MMHG
BH CV ECHO MEAS - LV MEAN PG: 1.3 MMHG
BH CV ECHO MEAS - LV SYSTOLIC VOL/BSA (12-30): 30.8 ML/M^2
BH CV ECHO MEAS - LV V1 MAX: 80.4 CM/SEC
BH CV ECHO MEAS - LV V1 MEAN: 54.5 CM/SEC
BH CV ECHO MEAS - LV V1 VTI: 15.2 CM
BH CV ECHO MEAS - LVIDD: 4.6 CM
BH CV ECHO MEAS - LVIDS: 3.6 CM
BH CV ECHO MEAS - LVOT AREA: 4.1 CM^2
BH CV ECHO MEAS - LVOT DIAM: 2.3 CM
BH CV ECHO MEAS - LVPWD: 0.78 CM
BH CV ECHO MEAS - MED PEAK E' VEL: 8 CM/SEC
BH CV ECHO MEAS - MV A MAX VEL: 51 CM/SEC
BH CV ECHO MEAS - MV DEC SLOPE: 157.6 CM/SEC^2
BH CV ECHO MEAS - MV DEC TIME: 0.28 SEC
BH CV ECHO MEAS - MV E MAX VEL: 44.9 CM/SEC
BH CV ECHO MEAS - MV E/A: 0.88
BH CV ECHO MEAS - MV MAX PG: 1.5 MMHG
BH CV ECHO MEAS - MV MEAN PG: 0.59 MMHG
BH CV ECHO MEAS - MV P1/2T: 46 MSEC
BH CV ECHO MEAS - MV V2 MAX: 60.8 CM/SEC
BH CV ECHO MEAS - MV V2 MEAN: 35.1 CM/SEC
BH CV ECHO MEAS - MV V2 VTI: 15.9 CM
BH CV ECHO MEAS - MVA(P1/2T): 4.8 CM2
BH CV ECHO MEAS - MVA(VTI): 3.9 CM^2
BH CV ECHO MEAS - PA ACC SLOPE: 687 CM/SEC2
BH CV ECHO MEAS - PA ACC TIME: 0.08 SEC
BH CV ECHO MEAS - PA MAX PG (FULL): 0.92 MMHG
BH CV ECHO MEAS - PA MAX PG: 2.4 MMHG
BH CV ECHO MEAS - PA MEAN PG (FULL): 0.41 MMHG
BH CV ECHO MEAS - PA MEAN PG: 1.2 MMHG
BH CV ECHO MEAS - PA PR(ACCEL): 42.7 MMHG
BH CV ECHO MEAS - PA V2 MAX: 77.5 CM/SEC
BH CV ECHO MEAS - PA V2 MEAN: 52.7 CM/SEC
BH CV ECHO MEAS - PA V2 VTI: 14.5 CM
BH CV ECHO MEAS - PAPD(PI EDV): 7 MMHG
BH CV ECHO MEAS - PI END-D VEL: 97.1 CM/SEC
BH CV ECHO MEAS - PULM A REVS DUR: 0.07 SEC
BH CV ECHO MEAS - PULM A REVS VEL: 40.6 CM/SEC
BH CV ECHO MEAS - PULM DIAS VEL: 45.5 CM/SEC
BH CV ECHO MEAS - PULM S/D: 0.87
BH CV ECHO MEAS - PULM SYS VEL: 39.5 CM/SEC
BH CV ECHO MEAS - RAP SYSTOLE: 3 MMHG
BH CV ECHO MEAS - RV MAX PG: 1.5 MMHG
BH CV ECHO MEAS - RV MEAN PG: 0.83 MMHG
BH CV ECHO MEAS - RV V1 MAX: 60.9 CM/SEC
BH CV ECHO MEAS - RV V1 MEAN: 43.2 CM/SEC
BH CV ECHO MEAS - RV V1 VTI: 12.6 CM
BH CV ECHO MEAS - RVSP: 18.8 MMHG
BH CV ECHO MEAS - SI(AO): 109.1 ML/M^2
BH CV ECHO MEAS - SI(CUBED): 27.5 ML/M^2
BH CV ECHO MEAS - SI(LVOT): 32.4 ML/M^2
BH CV ECHO MEAS - SI(MOD-SP2): 24.5 ML/M^2
BH CV ECHO MEAS - SI(MOD-SP4): 21.5 ML/M^2
BH CV ECHO MEAS - SI(TEICH): 23.4 ML/M^2
BH CV ECHO MEAS - SV(AO): 207.8 ML
BH CV ECHO MEAS - SV(CUBED): 52.4 ML
BH CV ECHO MEAS - SV(LVOT): 61.6 ML
BH CV ECHO MEAS - SV(MOD-SP2): 46.6 ML
BH CV ECHO MEAS - SV(MOD-SP4): 40.9 ML
BH CV ECHO MEAS - SV(TEICH): 44.5 ML
BH CV ECHO MEAS - TAPSE (>1.6): 1.1 CM2
BH CV ECHO MEAS - TR MAX VEL: 198.9 CM/SEC
BH CV ECHO MEASUREMENTS AVERAGE E/E' RATIO: 4.73
BH CV XLRA - RV BASE: 3.7 CM
BH CV XLRA - RV LENGTH: 6.7 CM
BH CV XLRA - RV MID: 3.6 CM
BH CV XLRA - TDI S': 5 CM/SEC
IVRT: 102 MSEC
LEFT ATRIUM VOLUME INDEX: 22 ML/M2
LEFT ATRIUM VOLUME: 42 CM3
MAXIMAL PREDICTED HEART RATE: 165 BPM
STRESS TARGET HR: 140 BPM

## 2020-01-01 PROCEDURE — 93306 TTE W/DOPPLER COMPLETE: CPT | Performed by: INTERNAL MEDICINE

## 2020-01-07 ENCOUNTER — OFFICE VISIT (OUTPATIENT)
Dept: CARDIOLOGY | Facility: CLINIC | Age: 55
End: 2020-01-07

## 2020-01-07 VITALS
OXYGEN SATURATION: 99 % | DIASTOLIC BLOOD PRESSURE: 81 MMHG | HEART RATE: 86 BPM | SYSTOLIC BLOOD PRESSURE: 111 MMHG | BODY MASS INDEX: 25.84 KG/M2 | WEIGHT: 175 LBS

## 2020-01-07 DIAGNOSIS — J98.59 MEDIASTINAL MASS: Primary | ICD-10-CM

## 2020-01-07 DIAGNOSIS — I51.9 DECREASED LEFT VENTRICULAR SYSTOLIC FUNCTION: ICD-10-CM

## 2020-01-07 DIAGNOSIS — D48.9 TERATOMA: ICD-10-CM

## 2020-01-07 PROCEDURE — 93000 ELECTROCARDIOGRAM COMPLETE: CPT | Performed by: INTERNAL MEDICINE

## 2020-01-07 PROCEDURE — 99214 OFFICE O/P EST MOD 30 MIN: CPT | Performed by: INTERNAL MEDICINE

## 2020-01-07 NOTE — PROGRESS NOTES
Cardiology Office Visit      Encounter Date:  01/07/2020    Patient ID:   Julien Turner is a 54 y.o. male.    Reason For Followup:  Cardiomyopathy  History of mediastinal teratoma    Brief Clinical History:  Dear Dr. Byrne, Dusty Guevara MD    I had the pleasure of seeing Julien Turner today. As you are well aware, this is a 54 y.o. male with no established history of ischemic heart disease.  He does have a history of nonischemic cardiomyopathy with most recent ejection fraction being 45 to 50%.  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  He presents today for follow-up on the above conditions.    Interval History:  He denies any chest pain pressure heaviness or tightness.  He denies any shortness of breath.  He denies any PND orthopnea.  He denies any syncope or near syncope.  He reports feeling quite well.    He has begun working out again.  He reports no limitations.  We reviewed his most recent echocardiogram which demonstrates a decline in his LV systolic function from 55 - 60% to 45 - 50%.  He has mild mitral insufficiency noted as well.  Mild right ventricular enlargement was noted which is unchanged from previous examination.  He also reports having a follow-up CT scan that demonstrated coronary calcification.  This is raise some concern for him.  We discussed options and he will proceed with coronary calcium scoring and cardiovascular screening.  His most recent PET scan was negative for recurrence of disease.    Assessment & Plan    Impressions:  Cardiomyopathy with most recent ejection fraction 45 to 50%  History of mediastinal teratoma status post surgical excision.     Most recent PET scan negative for recurrent disease  Mild mitral insufficiency  Mild right ventricular enlargement    Recommendations:  Heart and vascular screening including coronary calcium scoring.  Follow-up in 1 years time sooner should there be any significant  pathology on heart and vascular screening.    Objective:    Vitals:  Vitals:    01/07/20 0927   BP: 111/81   Pulse: 86   SpO2: 99%   Weight: 79.4 kg (175 lb)       Physical Exam:    General: Alert, cooperative, no distress, appears stated age  Head:  Normocephalic, atraumatic, mucous membranes moist  Eyes:  Conjunctiva/corneas clear, EOM's intact     Neck:  Supple,  no adenopathy;      Lungs: Clear to auscultation bilaterally, no wheezes rhonchi rales are noted  Chest wall: No tenderness  Heart::  Regular rate and rhythm, S1 and S2 normal, no murmur, rub or gallop  Abdomen: Soft, non-tender, nondistended bowel sounds active  Extremities: No cyanosis, clubbing, or edema  Pulses: 2+ and symmetric all extremities  Skin:  No rashes or lesions  Neuro/psych: A&O x3. CN II through XII are grossly intact with appropriate affect      Allergies:  Allergies   Allergen Reactions   • Contrast Dye Hives     Pt had hives all over chest and itchy palms.    Pt was pre-medicated prior to this scan.   • Tape Hives     Surgical tape       Medication Review:     Current Outpatient Medications:   •  cetirizine (zyrTEC) 10 MG tablet, Take 10 mg by mouth Daily., Disp: , Rfl:   •  fluticasone (FLONASE) 50 MCG/ACT nasal spray, 2 sprays into each nostril Daily., Disp: , Rfl:   •  diclofenac (VOLTAREN) 75 MG EC tablet, Take 75 mg by mouth 2 (Two) Times a Day., Disp: , Rfl: 0  •  Multiple Vitamins-Minerals (MULTI COMPLETE PO), Take  by mouth daily., Disp: , Rfl:     Family History:  Family History   Problem Relation Age of Onset   • Heart failure Father    • Hypertension Father    • Cancer Maternal Aunt        Past Medical History:  Past Medical History:   Diagnosis Date   • Allergic rhinitis    • Anxiety    • Asthma     controlled   • H/O Mediastinal mass    • History of colon polyps    • Hyperlipidemia    • Low iron    • Palpitations    • PNET (primitive neuroectodermal tumor) (CMS/HCC) 2017   • Tachycardia    • Teratoma     s/p surgical  excision - no evidence of malignancy noted        Past surgical History:  Past Surgical History:   Procedure Laterality Date   • COLONOSCOPY     • COLONOSCOPY N/A 7/25/2016    Procedure: COLONOSCOPY INTO CECUM/TERMINAL ILEUM WITH POLYPECTOMY;  Surgeon: Jarvis Garcia MD;  Location: [facility] ENDOSCOPY;  Service:    • ENDOSCOPY     • ENDOSCOPY N/A 7/25/2016    Procedure: ESOPHAGOGASTRODUODENOSCOPY WITH DILATATION;  Surgeon: Jarvis Garcia MD;  Location: [facility] ENDOSCOPY;  Service:    • FOOT SURGERY      right   • KNEE ARTHROSCOPY      right   • LASIK     • MEDIASTINAL MASS EXCISION  01/12/2017    Dr Hernández   • PERICARDIECTOMY  01/12/2017    Dr Hernández   • VASECTOMY         Social History:  Social History     Socioeconomic History   • Marital status:      Spouse name: Chloé   • Number of children: Not on file   • Years of education: Not on file   • Highest education level: Not on file   Occupational History   • Occupation: Behavorial health     Employer: [employer]   Tobacco Use   • Smoking status: Never Smoker   • Smokeless tobacco: Never Used   Substance and Sexual Activity   • Alcohol use: Yes     Alcohol/week: 2.0 standard drinks     Types: 1 Glasses of wine, 1 Cans of beer per week   • Drug use: No   • Sexual activity: Yes     Partners: Female     Birth control/protection: Surgical       Review of Systems:  The following systems were reviewed as they relate to the cardiovascular system: Constitutional, Eyes, ENT, Cardiovascular, Respiratory, Gastrointestinal, Integumentary, Neurological, Psychiatric, Hematologic, Endocrine, Musculoskeletal, and Genitourinary. The pertinent cardiovascular findings are reported above with all other cardiovascular points within those systems being negative.    Diagnostic Study Review:     Current Electrocardiogram:    ECG 12 Lead  Date/Time: 1/7/2020 6:15 PM  Performed by: Josue Chatterjee DO  Authorized by: Josue Chatterjee DO    Comparison: not compared with previous ECG   Previous ECG: no previous ECG available  Comments: Normal sinus rhythm with a ventricular rate of 86 bpm.  Consider right ventricular hypertrophy.  Nonspecific repolarization changes.  Normal QT and QTc intervals.  Borderline right axis deviation.              NOTE: The following portions of the patient's history were reviewed and updated this visit as appropriate: allergies, current medications, past family history, past medical history, past social history, past surgical history and problem list.

## 2020-01-10 ENCOUNTER — TRANSCRIBE ORDERS (OUTPATIENT)
Dept: ADMINISTRATIVE | Facility: HOSPITAL | Age: 55
End: 2020-01-10

## 2020-01-10 DIAGNOSIS — Z13.6 ENCOUNTER FOR SCREENING FOR VASCULAR DISEASE: Primary | ICD-10-CM

## 2020-04-09 ENCOUNTER — HOSPITAL ENCOUNTER (OUTPATIENT)
Dept: CT IMAGING | Facility: HOSPITAL | Age: 55
End: 2020-04-09

## 2020-04-09 ENCOUNTER — APPOINTMENT (OUTPATIENT)
Dept: CARDIOLOGY | Facility: HOSPITAL | Age: 55
End: 2020-04-09

## 2020-05-21 ENCOUNTER — APPOINTMENT (OUTPATIENT)
Dept: LAB | Facility: HOSPITAL | Age: 55
End: 2020-05-21

## 2020-07-20 NOTE — PROGRESS NOTES
Subjective     REASON FOR FOLLOWUP:    1. 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  Relative lymphocytosis negative flow cytometry  3.  Close follow-up planned per recommendation of Dr. Gonzalez at [facility]  4.    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  He comes in today for repeat scan and he is doing very well.      He has no chest pain cough or systemic complaints.  He has noticed no masses in his testicle-he is due for testicular ultrasound in 2 weeks    .  Alpha-fetoprotein and beta-hCG are pending  He is been to the dermatologist to show him the mole that I wanted checked out and they're keeping a close eye on this    He is due for colonoscopy this year and his old gastroenterologist has retired and I referred him to Dr. Celio Londono    PET scan will be done in 6 months because of his severe contrast allergy and annually through year5 per the recommendations of Dr. Gonzalez    Past Medical History:   Diagnosis Date   • Allergic rhinitis    • Anxiety    • Asthma     controlled   • H/O Mediastinal mass    • History of colon polyps    • Hyperlipidemia    • Low iron    • Palpitations    • PNET (primitive neuroectodermal tumor) (CMS/HCC) 2017   • Tachycardia    • Teratoma     s/p surgical excision - no evidence of malignancy noted         Past Surgical History:   Procedure Laterality Date   • COLONOSCOPY     • COLONOSCOPY N/A 7/25/2016    Procedure: COLONOSCOPY INTO CECUM/TERMINAL ILEUM WITH POLYPECTOMY;  Surgeon:  Jarvis Garcia MD;  Location: [facility] ENDOSCOPY;  Service:    • ENDOSCOPY     • ENDOSCOPY N/A 7/25/2016    Procedure: ESOPHAGOGASTRODUODENOSCOPY WITH DILATATION;  Surgeon: Jarvis Garcia MD;  Location: [facility] ENDOSCOPY;  Service:    • FOOT SURGERY      right   • KNEE ARTHROSCOPY      right   • LASIK     • MEDIASTINAL MASS EXCISION  01/12/2017    Dr Hernández   • PERICARDIECTOMY  01/12/2017    Dr Hernández   • VASECTOMY        Oncologic history   patient is a 51-year-old male who presented to his family doctor in September 2015 with a cough that developed while he was lying on his back.  Patient was felt to have allergies and given some medications for this but the symptoms don't improve.  He was then referred to ENT who evaluated him and scoped him and thought he had reflux symptoms and gave her medications for this.  Patient still do not get better once back to see the ENT doctor was CAT scan the sinuses and thought he may have some sinus problems.  He was then referred to an allergist who diagnosed some allergies and gave her medications again with no significant improvement.  He is treated for asthma and again did not improve.  Finally when his symptoms worsened a chest CT was done the end of December which showed a 16 x 10 x 11 cm anterior mediastinal mass with no parenchymal nodules or adenopathy in the chest or abdomen.  Patient saw Dr. Hernández who ordered beta hCG and alpha-fetoprotein which were normal and then proceeded with a resection of the tumor which was done on 1/12/17 .    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  The tumor was lifted off the great vessels and apparently was  completely resected in toto.  The cut surface of the tumor showed multiple cysts with very little solid tissue present between cystic lesions in numerous areas of possible calcification was identified .  The slides were reviewed at [facility] who confirmed the diagnosis.    postop really the patient has done very well and his breathing and discomfort have dramatically improved and he stopped most of his inhalers and asthma medications.  He's had no weight loss headache or other systemic complaints.  The patient reports chronic back pain due to an injury to L4-L5 and S1 many years ago and some frequency of urination at night but otherwise feels very well and actually moved into a new home 2 weeks before his surgery.     He was sent to [facility] see Dr. Taj Gonzalez for an opinion regarding adjuvant treatment and Dr. Gonzalez reviewed his case and recommended close follow-up with CAT scans every 2 months for the first 6 months and then less frequently thereafter with plans for re-surgery if there is abnormality detected and then adjuvant treatment with CAV alternating with ifosfamide and etoposide.  Flow cytometry of his peripheral blood was unremarkable    5/17  patient is a 51-year-old male with recent diagnosis of a mediastinal germ cell tumor which is predominately teratoma with a focus of primitive neuroectodermal tumor.  He was sent to [facility] see Dr. Taj Gonzalez for an opinion regarding adjuvant treatment and Dr. Gonzalez reviewed his case and recommended close follow-up with CAT scans every 2 months for the first 6 months and then less frequently thereafter with plans for re-surgery if there is abnormality detected and then adjuvant treatment with CAV alternating with ifosfamide and etoposide.  Con is back to review his CAT scans and overall is feeling well.  He is exercising but not able to lose weight and is actually gained a little weight which she surprised by.  His white  count shows a 22% eosinophilia which is new but he tells me he is resumed his allergy shots last month and this may explain it.  He has no other symptoms at this time  I reviewed his CAT scans and they show no change with some insignificant mediastinal nodes and a small lung nodule that was present prior to his original surgery and 2 hemangiomas in the liver that are stable.  AFP and beta hCG 2 months ago were normal and results are pending today.  7/17  Scans negative patient seen by the nurse practitioner in Dr. Cardoso's absence-tumor markers normal  9/17    His scans in July were fine but I was unavailable that day because of a medical emergency and the nurse practitioner reviewed the scans.  He was complaining of tenderness in the scrotum and an ultrasound was done which was thankfully benign and the symptoms resolved.  Since then he has been to see Dr. Gonzalez and unfortunately due to our  new EMR notes are not routinely forwarded to referring doctors and I have made an effort to have the notes sent to Dr. Gonzalez today along with the disks from his scans and thankfully his CAT scan from last week also is completely benign.  His tumor markers are pending from today but have been negative so far.  Dr. Gonzalez has recommended moving to 6 months  imaging the patient is still very anxious about recurrence of his disease and I tried to reassure him. we'll get one more scan in 3-4 months before moving to 6 months scans.    1/20    We set him up for genetic testing because of his colon polyps his atypical nevi and his testicular cancer and some unusual family history and it showed a V US in the kit gene but nothing else significant      Current Outpatient Medications on File Prior to Visit   Medication Sig Dispense Refill   • cetirizine (zyrTEC) 10 MG tablet Take 10 mg by mouth Daily.     • diclofenac (VOLTAREN) 75 MG EC tablet Take 75 mg by mouth 2 (Two) Times a Day.  0   • fluticasone (FLONASE) 50 MCG/ACT nasal  spray 2 sprays into each nostril Daily.     • [DISCONTINUED] Multiple Vitamins-Minerals (MULTI COMPLETE PO) Take  by mouth daily.       No current facility-administered medications on file prior to visit.         ALLERGIES:    Allergies   Allergen Reactions   • Contrast Dye Hives     Pt had hives all over chest and itchy palms.    Pt was pre-medicated prior to this scan.   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     Employer: [employer]   Tobacco Use   • Smoking status: Never Smoker   • Smokeless tobacco: Never Used   Substance and Sexual Activity   • Alcohol use: Yes     Alcohol/week: 2.0 standard drinks     Types: 1 Glasses of wine, 1 Cans of beer per week   • Drug use: No   • Sexual activity: Yes     Partners: Female     Birth control/protection: Surgical        Family History   Problem Relation Age of Onset   • Heart failure Father    • Hypertension Father    • Cancer Maternal Aunt     parents are in their 70s and healthy his one sister is healthy and 3 children are healthy maternal aunt had an unusual cancer in the gallbladder in her 40s his paternal grandmother had malignancy in her 40s of unknown type    Review of Systems   Constitutional: Negative.  Negative for fatigue.   HENT: Negative for mouth sores and sore throat.    Respiratory: Negative for chest tightness and shortness of breath.    Cardiovascular: Negative for chest pain.   Gastrointestinal: Negative for abdominal pain, constipation, diarrhea, nausea and vomiting.   Genitourinary: Negative for difficulty urinating.   Musculoskeletal: Negative for back pain.   Neurological: Negative for dizziness.   Psychiatric/Behavioral: Negative for sleep disturbance. The patient is  "not nervous/anxious.    All other systems reviewed and are negative.       Objective     Vitals:    07/23/20 1041   BP: 116/83   Pulse: 72   Resp: 16   Temp: 97.7 °F (36.5 °C)   SpO2: 99%   Weight: 75.9 kg (167 lb 6.4 oz)   Height: 178 cm (70.08\")   PainSc: 0-No pain     Current Status 11/26/2019   ECOG score 0       Physical Exam    GENERAL:  Well-developed, well-nourished in no acute distress.   SKIN:  Warm, dry without rashes, purpura or petechiae.Numerous atypical nevi noted   EYES:  Pupils equal, round and reactive to light.  EOMs intact.  Conjunctivae normal.  EARS:  Hearing intact.  NOSE:  Septum midline.  No excoriations or nasal discharge.  MOUTH:  Tongue is well-papillated; no stomatitis or ulcers.  Lips normal.  THROAT:  Oropharynx without lesions or exudates.  NECK:  Supple with good range of motion; no thyromegaly or masses, no JVD.  LYMPHATICS:  No cervical, supraclavicular, axillary or inguinal adenopathy.  CHEST:  Lungs clear to auscultation. Good airflow.  Sternotomy incision is well-healed  CARDIAC:  Regular rate and rhythm without murmurs, rubs or gallops. Normal S1,S2.  ABDOMEN:  Soft, nontender with no hepatosplenomegaly or masses.  Testicular exam deferred  EXTREMITIES:  No clubbing, cyanosis or edema.  NEUROLOGICAL:  Cranial Nerves II-XII grossly intact.  No focal neurological deficits.  PSYCHIATRIC:  Normal affect and mood.    I have reexamined the patient and the results are consistent with the previously documented exam. U Monse Cardoso MD       RECENT LABS:  Hematology WBC   Date Value Ref Range Status   07/23/2020 5.62 3.40 - 10.80 10*3/mm3 Final     RBC   Date Value Ref Range Status   07/23/2020 4.98 4.14 - 5.80 10*6/mm3 Final     Hemoglobin   Date Value Ref Range Status   07/23/2020 15.6 13.0 - 17.7 g/dL Final     Hematocrit   Date Value Ref Range Status   07/23/2020 45.8 37.5 - 51.0 % Final     Platelets   Date Value Ref Range Status   07/23/2020 231 140 - 450 10*3/mm3 Final    "     AFP and beta hCG are normal in 12/18        SCROTAL SONOGRAPHY WITH TESTICULAR DOPPLER     FINDINGS: Sonographic evaluation of the scrotal contents was performed.  The right testicle measures 4.5 x 2.6 x 2 cm and the left testicle  measures 4.2 x 3.2 x 1.9 cm. There are punctate echogenic foci seen in  both testes on the order of 2-3 in number. This is consistent with  minimal bilateral testicular microlithiasis. Normal vascularity is seen  within both testes. There is a 0.7 cm right epididymal head cyst versus  hematocele. A 1.6 cm left epididymal head cyst versus spermatocele is  also noted. No other abnormality is appreciated.     IMPRESSION:  1. Minimal bilateral testicular microlithiasis with approximately 2-3  punctate calcifications seen in both testes. The relative paucity of  microcalcifications argue against any active abnormality at this time.  However, follow-up scrotal sonography in one year is recommended.  2. Bilateral epididymal head cysts versus spermatoceles as described.   This report was finalized on 7/23/2018 9    F-18 FDG PET FROM SKULL BASE TO MID THIGH WITH PET/CT FUSION  FINDINGS: There is no suspicious hypermetabolic activity within the  mediastinum or chest. There is no suspicious hypermetabolic activity  within the neck. There is a fairly typical speckled pattern of activity  throughout the liver. There is no suspicious hypermetabolic activity  within the abdomen or pelvis.     IMPRESSION:  There is no evidence for recurrence or metastatic disease  within the mediastinum and there is no evidence for distant metastases.     This report was finalized on 12/4/2018      Study Result     SCROTAL SONOGRAPHY WITH TESTICULAR DOPPLER      IMPRESSION:  1. Stable minimal bilateral testicular microlithiasis.  2. Stable bilateral epididymal head cyst versus spermatocele.   3. There is a small right hydrocele.     This report was finalized on 7/31/2019 11:58 AM by Dr. Howard Buchanan M.D.        Study Result     F-18 FDG PET FROM SKULL BASE TO MID THIGH WITH PET/CT FUSION   IMPRESSION:  1.  No findings of FDG avid disease in the neck, chest, abdomen or  pelvis. Postsurgical changes from anterior mediastinal mass resection  without findings of local recurrence.  2.  Stable hypodense lesions in the liver since 2017 measuring up to 2.5  cm.  3.  Other findings as above.     This report was finalized on 11/20/2019 7:30 AM by Dr. Yoseph Landin M.D.                   Assessment/Plan     1.  Mediastinal germ cell tumor-  Completely resected 1/17 with normal preop alpha fetoprotein beta-hCG and a focus of primitive neuroectodermal tumor in the resected specimen which is predominantly mature teratoma  · Negative PET scan as of 11/19- 3  years from diagnosis  2.  Eosinophilia probably due to  allergy shots -resolved  3.  Atypical nevi evaluated by dermatology   4.  .  Tubular adenoma on colonoscopy in [id number] in 3 years  5  Significant allergic rash to IV contrast in 6/18 while on steroids-no further IV contrast as far as possible  6.  Genetic testing negative except for a VUS in the KIT gene    Plan  1.  return in 6  months with  afp /bhcg and PET scan and MD visit  2.  Testicular ultrasound annually due in July 2020  3.  Scans will be done annually for the next 2 years

## 2020-07-23 ENCOUNTER — OFFICE VISIT (OUTPATIENT)
Dept: ONCOLOGY | Facility: CLINIC | Age: 55
End: 2020-07-23

## 2020-07-23 ENCOUNTER — HOSPITAL ENCOUNTER (OUTPATIENT)
Dept: CARDIOLOGY | Facility: HOSPITAL | Age: 55
Discharge: HOME | End: 2020-07-23
Admitting: NURSE PRACTITIONER

## 2020-07-23 ENCOUNTER — HOSPITAL ENCOUNTER (OUTPATIENT)
Dept: CT IMAGING | Facility: HOSPITAL | Age: 55
Discharge: HOME | End: 2020-07-23

## 2020-07-23 ENCOUNTER — LAB (OUTPATIENT)
Dept: LAB | Facility: HOSPITAL | Age: 55
End: 2020-07-23

## 2020-07-23 VITALS
HEART RATE: 72 BPM | BODY MASS INDEX: 23.96 KG/M2 | HEIGHT: 70 IN | TEMPERATURE: 97.7 F | WEIGHT: 167.4 LBS | DIASTOLIC BLOOD PRESSURE: 83 MMHG | OXYGEN SATURATION: 99 % | RESPIRATION RATE: 16 BRPM | SYSTOLIC BLOOD PRESSURE: 116 MMHG

## 2020-07-23 DIAGNOSIS — J98.59 MEDIASTINAL MASS: ICD-10-CM

## 2020-07-23 DIAGNOSIS — C71.9 PNET (PRIMITIVE NEUROECTODERMAL TUMOR) (HCC): Primary | ICD-10-CM

## 2020-07-23 DIAGNOSIS — Z13.6 ENCOUNTER FOR SCREENING FOR VASCULAR DISEASE: ICD-10-CM

## 2020-07-23 DIAGNOSIS — C71.9 PNET (PRIMITIVE NEUROECTODERMAL TUMOR) (HCC): ICD-10-CM

## 2020-07-23 LAB
ALBUMIN SERPL-MCNC: 4.5 G/DL (ref 3.5–5.2)
ALBUMIN/GLOB SERPL: 1.7 G/DL (ref 1.1–2.4)
ALP SERPL-CCNC: 79 U/L (ref 38–116)
ALPHA-FETOPROTEIN: 2.61 NG/ML (ref 0–8.3)
ALT SERPL W P-5'-P-CCNC: 17 U/L (ref 0–41)
ANION GAP SERPL CALCULATED.3IONS-SCNC: 12.9 MMOL/L (ref 5–15)
AST SERPL-CCNC: 18 U/L (ref 0–40)
BASOPHILS # BLD AUTO: 0.04 10*3/MM3 (ref 0–0.2)
BASOPHILS NFR BLD AUTO: 0.7 % (ref 0–1.5)
BH CV XLRA MEAS - MID AO DIAM: 1.85 CM
BH CV XLRA MEAS - PAD LEFT ABI PT: 1.14
BH CV XLRA MEAS - PAD LEFT ARM: 121 MMHG
BH CV XLRA MEAS - PAD LEFT LEG PT: 140 MMHG
BH CV XLRA MEAS - PAD RIGHT ABI PT: 1.08
BH CV XLRA MEAS - PAD RIGHT ARM: 123 MMHG
BH CV XLRA MEAS - PAD RIGHT LEG PT: 133 MMHG
BH CV XLRA MEAS LEFT CCA RATIO VEL: -78.1 CM/SEC
BH CV XLRA MEAS LEFT ICA RATIO VEL: -61 CM/SEC
BH CV XLRA MEAS LEFT ICA/CCA RATIO: 0.78
BH CV XLRA MEAS RIGHT CCA RATIO VEL: 72 CM/SEC
BH CV XLRA MEAS RIGHT ICA RATIO VEL: -49 CM/SEC
BH CV XLRA MEAS RIGHT ICA/CCA RATIO: -0.68
BILIRUB SERPL-MCNC: 0.3 MG/DL (ref 0.2–1.2)
BUN SERPL-MCNC: 16 MG/DL (ref 6–20)
BUN/CREAT SERPL: 14.2 (ref 7.3–30)
CALCIUM SPEC-SCNC: 9.5 MG/DL (ref 8.5–10.2)
CHLORIDE SERPL-SCNC: 103 MMOL/L (ref 98–107)
CO2 SERPL-SCNC: 25.1 MMOL/L (ref 22–29)
CREAT SERPL-MCNC: 1.13 MG/DL (ref 0.7–1.3)
DEPRECATED RDW RBC AUTO: 41.4 FL (ref 37–54)
EOSINOPHIL # BLD AUTO: 0.33 10*3/MM3 (ref 0–0.4)
EOSINOPHIL NFR BLD AUTO: 5.9 % (ref 0.3–6.2)
ERYTHROCYTE [DISTWIDTH] IN BLOOD BY AUTOMATED COUNT: 12.2 % (ref 12.3–15.4)
GFR SERPL CREATININE-BSD FRML MDRD: 67 ML/MIN/1.73
GLOBULIN UR ELPH-MCNC: 2.6 GM/DL (ref 1.8–3.5)
GLUCOSE SERPL-MCNC: 106 MG/DL (ref 74–124)
HCT VFR BLD AUTO: 45.8 % (ref 37.5–51)
HGB BLD-MCNC: 15.6 G/DL (ref 13–17.7)
IMM GRANULOCYTES # BLD AUTO: 0.01 10*3/MM3 (ref 0–0.05)
IMM GRANULOCYTES NFR BLD AUTO: 0.2 % (ref 0–0.5)
LYMPHOCYTES # BLD AUTO: 2.53 10*3/MM3 (ref 0.7–3.1)
LYMPHOCYTES NFR BLD AUTO: 45 % (ref 19.6–45.3)
MCH RBC QN AUTO: 31.3 PG (ref 26.6–33)
MCHC RBC AUTO-ENTMCNC: 34.1 G/DL (ref 31.5–35.7)
MCV RBC AUTO: 92 FL (ref 79–97)
MONOCYTES # BLD AUTO: 0.45 10*3/MM3 (ref 0.1–0.9)
MONOCYTES NFR BLD AUTO: 8 % (ref 5–12)
NEUTROPHILS NFR BLD AUTO: 2.26 10*3/MM3 (ref 1.7–7)
NEUTROPHILS NFR BLD AUTO: 40.2 % (ref 42.7–76)
NRBC BLD AUTO-RTO: 0 /100 WBC (ref 0–0.2)
PLATELET # BLD AUTO: 231 10*3/MM3 (ref 140–450)
PMV BLD AUTO: 8.9 FL (ref 6–12)
POTASSIUM SERPL-SCNC: 4.4 MMOL/L (ref 3.5–4.7)
PROT SERPL-MCNC: 7.1 G/DL (ref 6.3–8)
RBC # BLD AUTO: 4.98 10*6/MM3 (ref 4.14–5.8)
SODIUM SERPL-SCNC: 141 MMOL/L (ref 134–145)
WBC # BLD AUTO: 5.62 10*3/MM3 (ref 3.4–10.8)

## 2020-07-23 PROCEDURE — 75571 CT HRT W/O DYE W/CA TEST: CPT

## 2020-07-23 PROCEDURE — 80053 COMPREHEN METABOLIC PANEL: CPT

## 2020-07-23 PROCEDURE — 85025 COMPLETE CBC W/AUTO DIFF WBC: CPT

## 2020-07-23 PROCEDURE — 99213 OFFICE O/P EST LOW 20 MIN: CPT | Performed by: INTERNAL MEDICINE

## 2020-07-23 PROCEDURE — 93799 UNLISTED CV SVC/PROCEDURE: CPT

## 2020-07-23 PROCEDURE — 36415 COLL VENOUS BLD VENIPUNCTURE: CPT

## 2020-07-23 PROCEDURE — 82105 ALPHA-FETOPROTEIN SERUM: CPT | Performed by: INTERNAL MEDICINE

## 2020-07-24 LAB — HCG INTACT+B SERPL-ACNC: <1 MIU/ML (ref 0–3)

## 2020-07-31 ENCOUNTER — HOSPITAL ENCOUNTER (OUTPATIENT)
Dept: ULTRASOUND IMAGING | Facility: HOSPITAL | Age: 55
Discharge: HOME | End: 2020-07-31
Admitting: INTERNAL MEDICINE

## 2020-07-31 DIAGNOSIS — C71.9 PNET (PRIMITIVE NEUROECTODERMAL TUMOR) (HCC): ICD-10-CM

## 2020-07-31 PROCEDURE — 76870 US EXAM SCROTUM: CPT

## 2020-10-26 ENCOUNTER — OFFICE VISIT (OUTPATIENT)
Dept: GASTROENTEROLOGY | Facility: CLINIC | Age: 55
End: 2020-10-26

## 2020-10-26 VITALS
BODY MASS INDEX: 25.18 KG/M2 | WEIGHT: 170 LBS | DIASTOLIC BLOOD PRESSURE: 75 MMHG | HEIGHT: 69 IN | SYSTOLIC BLOOD PRESSURE: 125 MMHG

## 2020-10-26 DIAGNOSIS — K63.5 POLYP OF COLON, UNSPECIFIED PART OF COLON, UNSPECIFIED TYPE: Primary | ICD-10-CM

## 2020-10-26 PROCEDURE — 99214 OFFICE O/P EST MOD 30 MIN: CPT | Performed by: INTERNAL MEDICINE

## 2020-10-26 RX ORDER — ATORVASTATIN CALCIUM 10 MG/1
10 TABLET, FILM COATED ORAL DAILY
COMMUNITY
End: 2021-01-07

## 2020-10-26 RX ORDER — OXYBUTYNIN CHLORIDE 5 MG/1
5 TABLET ORAL 2 TIMES DAILY
COMMUNITY

## 2020-10-26 NOTE — PROGRESS NOTES
Chief Complaint   Patient presents with   • Colonoscopy       History of Present Illness:   55 y.o. 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 No chemo or XRT.        He is here for a colonoscopy. He used to see Dr. Garcia and he is retired. He had a small tubular adenoma removed. He feels good. NO abddominal or chest pain. Rare heartburn. No dysphagia. No rectal bleeding or melena. No diarrhea or constipation. Nonsmoker. Occasional ETOH. 3 kids. .       Past Medical History:   Diagnosis Date   • Allergic rhinitis    • Anxiety    • Asthma     controlled   • H/O Mediastinal mass    • History of colon polyps    • Hyperlipidemia    • Low iron    • Palpitations    • PNET (primitive neuroectodermal tumor) (CMS/HCC) 2017   • Tachycardia    • Teratoma     s/p surgical excision - no evidence of malignancy noted        Past Surgical History:   Procedure Laterality Date   • COLONOSCOPY     • COLONOSCOPY N/A 7/25/2016    Procedure: COLONOSCOPY INTO CECUM/TERMINAL ILEUM WITH POLYPECTOMY;  Surgeon: Jarvis Garcia MD;  Location: [facility] ENDOSCOPY;  Service:    • ENDOSCOPY     • ENDOSCOPY N/A 7/25/2016    Procedure: ESOPHAGOGASTRODUODENOSCOPY WITH DILATATION;  Surgeon: Jarvis Garcia MD;  Location: [facility] ENDOSCOPY;  Service:    • FOOT SURGERY      right   • KNEE ARTHROSCOPY      right   • LASIK     • MEDIASTINAL MASS EXCISION  01/12/2017    Dr Hernández   • PERICARDIECTOMY  01/12/2017    Dr Hernández   • VASECTOMY           Current Outpatient Medications:   •  atorvastatin (LIPITOR) 10 MG tablet, Take 10 mg by mouth Daily., Disp: , Rfl:   •  cetirizine (zyrTEC) 10 MG tablet, Take 10 mg by mouth Daily., Disp: , Rfl:   •  diclofenac (VOLTAREN) 75 MG EC tablet, Take 75 mg by mouth 2 (Two) Times a Day., Disp: , Rfl: 0  •  fluticasone (FLONASE) 50 MCG/ACT nasal spray, 2 sprays into each nostril Daily., Disp: , Rfl:   •  oxybutynin  (DITROPAN) 5 MG tablet, Take 5 mg by mouth 2 (two) times a day., Disp: , Rfl:     Allergies   Allergen Reactions   • Contrast Dye Hives     Pt had hives all over chest and itchy palms.    Pt was pre-medicated prior to this scan.   • Tape Hives     Surgical tape       Family History   Problem Relation Age of Onset   • Heart failure Father    • Hypertension Father    • Cancer Maternal Aunt        Social History     Socioeconomic History   • Marital status:      Spouse name: Chloé   • Number of children: Not on file   • Years of education: Not on file   • Highest education level: Not on file   Occupational History   • Occupation: Behavorial health     Employer: [employer]   Tobacco Use   • Smoking status: Never Smoker   • Smokeless tobacco: Never Used   Substance and Sexual Activity   • Alcohol use: Yes     Alcohol/week: 2.0 standard drinks     Types: 1 Glasses of wine, 1 Cans of beer per week   • Drug use: No   • Sexual activity: Yes     Partners: Female     Birth control/protection: Surgical       Review of Systems   Gastrointestinal: Negative for abdominal pain.     Pertinent positives and negatives documented in the HPI and all other systems reviewed and were found to be negative.  Vitals:    10/26/20 0838   BP: 125/75       Physical Exam  Vitals signs reviewed.   Constitutional:       General: He is not in acute distress.     Appearance: He is well-developed. He is not diaphoretic.   HENT:      Head: Normocephalic and atraumatic. Hair is normal.      Right Ear: Hearing, tympanic membrane, ear canal and external ear normal.      Left Ear: Hearing, tympanic membrane, ear canal and external ear normal.      Nose: No nasal deformity.      Mouth/Throat:      Mouth: No oral lesions.      Pharynx: Uvula midline. No uvula swelling.   Eyes:      General: Lids are normal. No scleral icterus.        Right eye: No discharge.         Left eye: No discharge.      Extraocular Movements:      Right  eye: Normal extraocular motion and no nystagmus.      Left eye: Normal extraocular motion and no nystagmus.      Conjunctiva/sclera: Conjunctivae normal.      Pupils: Pupils are equal, round, and reactive to light.   Neck:      Musculoskeletal: Normal range of motion and neck supple.      Thyroid: No thyromegaly.      Vascular: No JVD.   Cardiovascular:      Rate and Rhythm: Normal rate and regular rhythm.      Pulses: Normal pulses.      Heart sounds: Normal heart sounds. No murmur. No gallop.    Pulmonary:      Effort: Pulmonary effort is normal. No respiratory distress.      Breath sounds: Normal breath sounds. No wheezing or rales.   Chest:      Chest wall: No tenderness.   Abdominal:      General: Bowel sounds are normal. There is no distension.      Palpations: Abdomen is soft. There is no mass.      Tenderness: There is no abdominal tenderness. There is no guarding.      Hernia: No hernia is present.   Musculoskeletal: Normal range of motion.         General: No tenderness or deformity.   Lymphadenopathy:      Cervical: No cervical adenopathy.   Skin:     General: Skin is warm and dry.      Findings: No rash.   Neurological:      Mental Status: He is alert and oriented to person, place, and time.      Cranial Nerves: No cranial nerve deficit.      Motor: No abnormal muscle tone.      Coordination: Coordination normal.      Deep Tendon Reflexes: Reflexes are normal and symmetric. Reflexes normal.   Psychiatric:         Behavior: Behavior normal.         Thought Content: Thought content normal.         Judgment: Judgment normal.         Diagnoses and all orders for this visit:    1. Polyp of colon, unspecified part of colon, unspecified type (Primary)  -     Case Request; Standing  -     Case Request    Other orders  -     Follow Anesthesia Guidelines / Standing Orders; Future  -     Obtain Informed Consent; Future  -     Implement Anesthesia orders day of procedure.; Standing  -     Obtain informed consent;  Standing  -     Verify bowel prep was successful; Standing  -     Give tap water enema if bowel prep was insufficient; Standing      Assessment:  1. H/o colon polyp.  2. 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 colonoscopy    No follow-ups on file.    Celio Joy MD  10/26/2020

## 2020-11-18 PROBLEM — K63.5 POLYP OF COLON: Status: ACTIVE | Noted: 2020-11-18

## 2020-11-27 ENCOUNTER — TRANSCRIBE ORDERS (OUTPATIENT)
Dept: PULMONOLOGY | Facility: HOSPITAL | Age: 55
End: 2020-11-27

## 2020-11-27 DIAGNOSIS — Z01.818 OTHER SPECIFIED PRE-OPERATIVE EXAMINATION: Primary | ICD-10-CM

## 2020-12-18 ENCOUNTER — LAB (OUTPATIENT)
Dept: LAB | Facility: HOSPITAL | Age: 55
End: 2020-12-18

## 2020-12-18 DIAGNOSIS — Z01.818 OTHER SPECIFIED PRE-OPERATIVE EXAMINATION: ICD-10-CM

## 2020-12-18 PROCEDURE — C9803 HOPD COVID-19 SPEC COLLECT: HCPCS

## 2020-12-18 PROCEDURE — U0004 COV-19 TEST NON-CDC HGH THRU: HCPCS

## 2020-12-19 LAB — SARS-COV-2 RNA RESP QL NAA+PROBE: NOT DETECTED

## 2020-12-21 ENCOUNTER — ANESTHESIA EVENT (OUTPATIENT)
Dept: GASTROENTEROLOGY | Facility: HOSPITAL | Age: 55
End: 2020-12-21

## 2020-12-21 ENCOUNTER — HOSPITAL ENCOUNTER (OUTPATIENT)
Facility: HOSPITAL | Age: 55
Setting detail: HOSPITAL OUTPATIENT SURGERY
Discharge: HOME | End: 2020-12-21
Attending: INTERNAL MEDICINE | Admitting: INTERNAL MEDICINE

## 2020-12-21 ENCOUNTER — ANESTHESIA (OUTPATIENT)
Dept: GASTROENTEROLOGY | Facility: HOSPITAL | Age: 55
End: 2020-12-21

## 2020-12-21 VITALS
OXYGEN SATURATION: 99 % | WEIGHT: 167.56 LBS | BODY MASS INDEX: 24.82 KG/M2 | DIASTOLIC BLOOD PRESSURE: 73 MMHG | TEMPERATURE: 97.7 F | SYSTOLIC BLOOD PRESSURE: 98 MMHG | HEIGHT: 69 IN | HEART RATE: 68 BPM | RESPIRATION RATE: 16 BRPM

## 2020-12-21 DIAGNOSIS — K63.5 POLYP OF COLON, UNSPECIFIED PART OF COLON, UNSPECIFIED TYPE: ICD-10-CM

## 2020-12-21 PROCEDURE — 45380 COLONOSCOPY AND BIOPSY: CPT | Performed by: INTERNAL MEDICINE

## 2020-12-21 PROCEDURE — 25010000002 PROPOFOL 10 MG/ML EMULSION: Performed by: NURSE ANESTHETIST, CERTIFIED REGISTERED

## 2020-12-21 PROCEDURE — 45385 COLONOSCOPY W/LESION REMOVAL: CPT | Performed by: INTERNAL MEDICINE

## 2020-12-21 PROCEDURE — 88305 TISSUE EXAM BY PATHOLOGIST: CPT | Performed by: INTERNAL MEDICINE

## 2020-12-21 RX ORDER — SODIUM CHLORIDE, SODIUM LACTATE, POTASSIUM CHLORIDE, CALCIUM CHLORIDE 600; 310; 30; 20 MG/100ML; MG/100ML; MG/100ML; MG/100ML
1000 INJECTION, SOLUTION INTRAVENOUS CONTINUOUS
Status: DISCONTINUED | OUTPATIENT
Start: 2020-12-21 | End: 2020-12-21 | Stop reason: HOSPADM

## 2020-12-21 RX ORDER — PROPOFOL 10 MG/ML
VIAL (ML) INTRAVENOUS CONTINUOUS PRN
Status: DISCONTINUED | OUTPATIENT
Start: 2020-12-21 | End: 2020-12-21 | Stop reason: SURG

## 2020-12-21 RX ORDER — PROMETHAZINE HYDROCHLORIDE 25 MG/1
25 TABLET ORAL ONCE AS NEEDED
Status: DISCONTINUED | OUTPATIENT
Start: 2020-12-21 | End: 2020-12-21 | Stop reason: HOSPADM

## 2020-12-21 RX ORDER — PROMETHAZINE HYDROCHLORIDE 25 MG/1
25 SUPPOSITORY RECTAL ONCE AS NEEDED
Status: DISCONTINUED | OUTPATIENT
Start: 2020-12-21 | End: 2020-12-21 | Stop reason: HOSPADM

## 2020-12-21 RX ORDER — PROPOFOL 10 MG/ML
VIAL (ML) INTRAVENOUS AS NEEDED
Status: DISCONTINUED | OUTPATIENT
Start: 2020-12-21 | End: 2020-12-21 | Stop reason: SURG

## 2020-12-21 RX ADMIN — PROPOFOL 200 MCG/KG/MIN: 10 INJECTION, EMULSION INTRAVENOUS at 14:49

## 2020-12-21 RX ADMIN — SODIUM CHLORIDE, POTASSIUM CHLORIDE, SODIUM LACTATE AND CALCIUM CHLORIDE 1000 ML: 600; 310; 30; 20 INJECTION, SOLUTION INTRAVENOUS at 14:20

## 2020-12-21 RX ADMIN — PROPOFOL 100 MG: 10 INJECTION, EMULSION INTRAVENOUS at 14:49

## 2020-12-21 NOTE — ANESTHESIA POSTPROCEDURE EVALUATION
Patient: Julien Turner    Procedure Summary     Date: 12/21/20 Room / Location:  [facility] /  [facility]    Anesthesia Start: 1440 Anesthesia Stop: 1513    Procedure: COLONOSCOPY to cecum with cold snare polypectomies (N/A ) Diagnosis:       Polyp of colon, unspecified part of colon, unspecified type      (Polyp of colon, unspecified part of colon, unspecified type [K63.5])    Surgeon: Celio Joy MD Provider: Tomer Byrd MD    Anesthesia Type: MAC ASA Status: 3          Anesthesia Type: MAC    Vitals  No vitals data found for the desired time range.          Post Anesthesia Care and Evaluation    Patient location during evaluation: PHASE II  Patient participation: complete - patient participated  Level of consciousness: awake and alert  Pain management: adequate  Airway patency: patent  Anesthetic complications: No anesthetic complications  PONV Status: none  Cardiovascular status: acceptable  Respiratory status: acceptable  Hydration status: acceptable

## 2020-12-21 NOTE — ANESTHESIA PREPROCEDURE EVALUATION
Anesthesia Evaluation     Patient summary reviewed and Nursing notes reviewed                Airway   Mallampati: II  TM distance: >3 FB  Neck ROM: full  No difficulty expected  Dental - normal exam     Pulmonary - normal exam   (+) asthma,  Cardiovascular - normal exam    (+) dysrhythmias Tachycardia, hyperlipidemia,     ROS comment: Hx of excision of mediastinal mass (PNET)/pericardiectomy/decreased LV fxn    Neuro/Psych  (+) psychiatric history Anxiety,     GI/Hepatic/Renal/Endo      Musculoskeletal     Abdominal  - normal exam   Substance History      OB/GYN          Other      history of cancer      Other Comment: PNET (primitive neuroectodermal tumor)                  Anesthesia Plan    ASA 3     MAC     intravenous induction     Anesthetic plan, all risks, benefits, and alternatives have been provided, discussed and informed consent has been obtained with: patient.    Plan discussed with CRNA.

## 2020-12-21 NOTE — H&P
Chief Complaint   Patient presents with   • Colonoscopy         History of Present Illness:   55 y.o. 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 No chemo or XRT.        He is here for a colonoscopy. He used to see Dr. Garcia and he is retired. He had a small tubular adenoma removed. He feels good. NO abddominal or chest pain. Rare heartburn. No dysphagia. No rectal bleeding or melena. No diarrhea or constipation. Nonsmoker. Occasional ETOH. 3 kids. .         Medical History        Past Medical History:   Diagnosis Date   • Allergic rhinitis     • Anxiety     • Asthma       controlled   • H/O Mediastinal mass     • History of colon polyps     • Hyperlipidemia     • Low iron     • Palpitations     • PNET (primitive neuroectodermal tumor) (CMS/HCC) 2017   • Tachycardia     • Teratoma       s/p surgical excision - no evidence of malignancy noted            Surgical History         Past Surgical History:   Procedure Laterality Date   • COLONOSCOPY       • COLONOSCOPY N/A 7/25/2016     Procedure: COLONOSCOPY INTO CECUM/TERMINAL ILEUM WITH POLYPECTOMY;  Surgeon: Jarvis Garcia MD;  Location: [facility] ENDOSCOPY;  Service:    • ENDOSCOPY       • ENDOSCOPY N/A 7/25/2016     Procedure: ESOPHAGOGASTRODUODENOSCOPY WITH DILATATION;  Surgeon: Jarvis Garcia MD;  Location: [facility] ENDOSCOPY;  Service:    • FOOT SURGERY         right   • KNEE ARTHROSCOPY         right   • LASIK       • MEDIASTINAL MASS EXCISION   01/12/2017     Dr Hernández   • PERICARDIECTOMY   01/12/2017     Dr Hernández   • VASECTOMY                  Current Outpatient Medications:   •  atorvastatin (LIPITOR) 10 MG tablet, Take 10 mg by mouth Daily., Disp: , Rfl:   •  cetirizine (zyrTEC) 10 MG tablet, Take 10 mg by mouth Daily., Disp: , Rfl:   •  diclofenac (VOLTAREN) 75 MG EC tablet, Take 75 mg by mouth 2 (Two) Times a Day., Disp: , Rfl: 0  •  fluticasone  (FLONASE) 50 MCG/ACT nasal spray, 2 sprays into each nostril Daily., Disp: , Rfl:   •  oxybutynin (DITROPAN) 5 MG tablet, Take 5 mg by mouth 2 (two) times a day., Disp: , Rfl:            Allergies   Allergen Reactions   • Contrast Dye Hives       Pt had hives all over chest and itchy palms.     Pt was pre-medicated prior to this scan.   • Tape Hives       Surgical tape               Family History   Problem Relation Age of Onset   • Heart failure Father     • Hypertension Father     • Cancer Maternal Aunt           Social History   Social History            Socioeconomic History   • Marital status:        Spouse name: Chloé   • Number of children: Not on file   • Years of education: Not on file   • Highest education level: Not on file   Occupational History   • Occupation: [employer]       Employer: [employer]   Tobacco Use   • Smoking status: Never Smoker   • Smokeless tobacco: Never Used   Substance and Sexual Activity   • Alcohol use: Yes       Alcohol/week: 2.0 standard drinks       Types: 1 Glasses of wine, 1 Cans of beer per week   • Drug use: No   • Sexual activity: Yes       Partners: Female       Birth control/protection: Surgical           Review of Systems   Gastrointestinal: Negative for abdominal pain.      Pertinent positives and negatives documented in the HPI and all other systems reviewed and were found to be negative.      Vitals:     10/26/20 0838   BP: 125/75         Physical Exam  Vitals signs reviewed.   Constitutional:       General: He is not in acute distress.     Appearance: He is well-developed. He is not diaphoretic.   HENT:      Head: Normocephalic and atraumatic. Hair is normal.      Right Ear: Hearing, tympanic membrane, ear canal and external ear normal.      Left Ear: Hearing, tympanic membrane, ear canal and external ear normal.      Nose: No nasal deformity.      Mouth/Throat:      Mouth: No oral lesions.      Pharynx: Uvula midline. No uvula  swelling.   Eyes:      General: Lids are normal. No scleral icterus.        Right eye: No discharge.         Left eye: No discharge.      Extraocular Movements:      Right eye: Normal extraocular motion and no nystagmus.      Left eye: Normal extraocular motion and no nystagmus.      Conjunctiva/sclera: Conjunctivae normal.      Pupils: Pupils are equal, round, and reactive to light.   Neck:      Musculoskeletal: Normal range of motion and neck supple.      Thyroid: No thyromegaly.      Vascular: No JVD.   Cardiovascular:      Rate and Rhythm: Normal rate and regular rhythm.      Pulses: Normal pulses.      Heart sounds: Normal heart sounds. No murmur. No gallop.    Pulmonary:      Effort: Pulmonary effort is normal. No respiratory distress.      Breath sounds: Normal breath sounds. No wheezing or rales.   Chest:      Chest wall: No tenderness.   Abdominal:      General: Bowel sounds are normal. There is no distension.      Palpations: Abdomen is soft. There is no mass.      Tenderness: There is no abdominal tenderness. There is no guarding.      Hernia: No hernia is present.   Musculoskeletal: Normal range of motion.         General: No tenderness or deformity.   Lymphadenopathy:      Cervical: No cervical adenopathy.   Skin:     General: Skin is warm and dry.      Findings: No rash.   Neurological:      Mental Status: He is alert and oriented to person, place, and time.      Cranial Nerves: No cranial nerve deficit.      Motor: No abnormal muscle tone.      Coordination: Coordination normal.      Deep Tendon Reflexes: Reflexes are normal and symmetric. Reflexes normal.   Psychiatric:         Behavior: Behavior normal.         Thought Content: Thought content normal.         Judgment: Judgment normal.            Diagnoses and all orders for this visit:     1. Polyp of colon, unspecified part of colon, unspecified type (Primary)  -     Case Request; Standing  -     Case Request     Other orders  -     Follow  Anesthesia Guidelines / Standing Orders; Future  -     Obtain Informed Consent; Future  -     Implement Anesthesia orders day of procedure.; Standing  -     Obtain informed consent; Standing  -     Verify bowel prep was successful; Standing  -     Give tap water enema if bowel prep was insufficient; Standing        Assessment:  1. H/o colon polyp.  2. 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 colonoscopy     No follow-ups on file.     12/21/20 - NO change from the above H and P.   Celio Joy MD

## 2020-12-23 LAB
LAB AP CASE REPORT: NORMAL
PATH REPORT.FINAL DX SPEC: NORMAL
PATH REPORT.GROSS SPEC: NORMAL

## 2020-12-28 NOTE — PROGRESS NOTES
12/27/20  Tell him that the colon polyps that were removed were not cancerous but many were precancerous. I recommend a repeat colonoscopy in 3 yrs. FAX to his PCP.   Kimmie davis

## 2021-01-04 RX ORDER — MOMETASONE FUROATE AND FORMOTEROL FUMARATE DIHYDRATE 200; 5 UG/1; UG/1
AEROSOL RESPIRATORY (INHALATION)
COMMUNITY
Start: 2020-11-14

## 2021-01-04 RX ORDER — ATORVASTATIN CALCIUM 20 MG/1
TABLET, FILM COATED ORAL
COMMUNITY
Start: 2020-10-17 | End: 2021-01-07 | Stop reason: SDUPTHER

## 2021-01-04 RX ORDER — DOXYCYCLINE HYCLATE 100 MG/1
CAPSULE ORAL
COMMUNITY
Start: 2020-11-24

## 2021-01-07 ENCOUNTER — TELEPHONE (OUTPATIENT)
Dept: GASTROENTEROLOGY | Facility: CLINIC | Age: 56
End: 2021-01-07

## 2021-01-07 ENCOUNTER — OFFICE VISIT (OUTPATIENT)
Dept: CARDIOLOGY | Facility: CLINIC | Age: 56
End: 2021-01-07

## 2021-01-07 VITALS
DIASTOLIC BLOOD PRESSURE: 83 MMHG | SYSTOLIC BLOOD PRESSURE: 118 MMHG | WEIGHT: 171 LBS | RESPIRATION RATE: 18 BRPM | OXYGEN SATURATION: 99 % | HEART RATE: 70 BPM | BODY MASS INDEX: 25.33 KG/M2 | HEIGHT: 69 IN

## 2021-01-07 DIAGNOSIS — E78.2 MIXED HYPERLIPIDEMIA: ICD-10-CM

## 2021-01-07 DIAGNOSIS — I51.9 DECREASED LEFT VENTRICULAR SYSTOLIC FUNCTION: ICD-10-CM

## 2021-01-07 DIAGNOSIS — D48.9 TERATOMA: ICD-10-CM

## 2021-01-07 DIAGNOSIS — R93.1 AGATSTON CORONARY ARTERY CALCIUM SCORE BETWEEN 100 AND 400: Primary | ICD-10-CM

## 2021-01-07 PROCEDURE — 93000 ELECTROCARDIOGRAM COMPLETE: CPT | Performed by: INTERNAL MEDICINE

## 2021-01-07 PROCEDURE — 99214 OFFICE O/P EST MOD 30 MIN: CPT | Performed by: INTERNAL MEDICINE

## 2021-01-07 RX ORDER — ASPIRIN 81 MG/1
81 TABLET, CHEWABLE ORAL DAILY
COMMUNITY

## 2021-01-07 RX ORDER — VITAMIN B COMPLEX
TABLET ORAL
COMMUNITY

## 2021-01-07 RX ORDER — ATORVASTATIN CALCIUM 20 MG/1
20 TABLET, FILM COATED ORAL DAILY
Qty: 90 TABLET | Refills: 3 | Status: SHIPPED | OUTPATIENT
Start: 2021-01-07 | End: 2021-12-27

## 2021-01-07 NOTE — PROGRESS NOTES
Cardiology Office Visit      Encounter Date:  01/07/2021    Patient ID:   Julien Turner is a 55 y.o. male.    Reason For Followup:  Nonischemic cardiomyopathy  History of mediastinal teratoma    Brief Clinical History:  Dear Dr. Byrne, Dusty Guevara MD    I had the pleasure of seeing Julien Turner today. As you are well aware, this is a 55 y.o. male with no established history of ischemic heart disease.  He does have a history of nonischemic cardiomyopathy with most recent ejection fraction being 45 to 50%.  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  He presents today for follow-up on the above conditions.     Interval History:  He denies any chest pain pressure heaviness or tightness.  He denies any shortness of breath.  He denies any PND orthopnea.  He denies any syncope or near syncope.  He reports feeling quite well.     He reports no limitations.  His most recent echocardiogram from 2019 demonstrated a decline in his LV systolic function from 55 - 60% to 45 - 50%.  He has mild mitral insufficiency noted as well.  Mild right ventricular enlargement was noted which is unchanged from previous examination.  He also reports having a follow-up CT scan that demonstrated coronary calcification.  He had coronary calcium scoring performed as a result of this and his score was 135.8 with all of the calcium being located in the LAD.  We discussed the implications of this and we will continue to monitor.   We have added atorvastatin to his medical regimen.     Assessment & Plan     Impressions:  Cardiomyopathy with most recent ejection fraction 45 to 50%  History of mediastinal teratoma status post surgical excision.     Most recent PET scan negative for recurrent disease  Mild mitral insufficiency  Mild right ventricular enlargement     Recommendations:  Add atorvastatin 20 mg daily  Lipid profile in 6 weeks  Follow-up in 1  "year    Objective:    Vitals:  Vitals:    01/07/21 0843   BP: 118/83   BP Location: Left arm   Patient Position: Sitting   Cuff Size: Large Adult   Pulse: 70   Resp: 18   SpO2: 99%   Weight: 77.6 kg (171 lb)   Height: 175.3 cm (69\")       Physical Exam:    General: Alert, cooperative, no distress, appears stated age  Head:  Normocephalic, atraumatic, mucous membranes moist  Eyes:  Conjunctiva/corneas clear, EOM's intact     Neck:  Supple,  no bruit  Lungs: Clear to auscultation bilaterally, no wheezes rhonchi rales are noted  Chest wall: No tenderness  Heart::  Regular rate and rhythm, S1 and S2 normal, 1/6 holosystolic murmur.  No rub or gallop  Abdomen: Soft, non-tender, nondistended bowel sounds active  Extremities: No cyanosis, clubbing, or edema  Pulses: 2+ and symmetric all extremities  Skin:  No rashes or lesions  Neuro/psych: A&O x3. CN II through XII are grossly intact with appropriate affect      Allergies:  Allergies   Allergen Reactions   • Contrast Dye Hives     Pt had hives all over chest and itchy palms.    Pt was pre-medicated prior to this scan.   • Tape Hives     Surgical tape       Medication Review:     Current Outpatient Medications:   •  aspirin 81 MG chewable tablet, Chew 81 mg Daily., Disp: , Rfl:   •  atorvastatin (LIPITOR) 20 MG tablet, TK 1 T PO QHS, Disp: , Rfl:   •  cetirizine (zyrTEC) 10 MG tablet, Take 10 mg by mouth Daily., Disp: , Rfl:   •  Coenzyme Q10 (CoQ10) 100 MG capsule, Take  by mouth., Disp: , Rfl:   •  doxycycline (VIBRAMYCIN) 100 MG capsule, TK 1 C PO BID WF, Disp: , Rfl:   •  Dulera 200-5 MCG/ACT inhaler, USE 2 PUFFS BID, Disp: , Rfl:   •  fluticasone (FLONASE) 50 MCG/ACT nasal spray, 2 sprays into each nostril Daily., Disp: , Rfl:   •  oxybutynin (DITROPAN) 5 MG tablet, Take 5 mg by mouth 2 (two) times a day., Disp: , Rfl:     Family History:  Family History   Problem Relation Age of Onset   • Heart failure Father    • Hypertension Father    • Cancer Maternal Aunt  "       Past Medical History:  Past Medical History:   Diagnosis Date   • Allergic rhinitis    • Anxiety    • Asthma     controlled   • H/O Mediastinal mass    • History of colon polyps    • Hyperlipidemia    • Low iron    • Palpitations    • PNET (primitive neuroectodermal tumor) (CMS/HCC) 2017   • Tachycardia    • Teratoma     s/p surgical excision - no evidence of malignancy noted        Past surgical History:  Past Surgical History:   Procedure Laterality Date   • COLONOSCOPY     • COLONOSCOPY N/A 7/25/2016    Procedure: COLONOSCOPY INTO CECUM/TERMINAL ILEUM WITH POLYPECTOMY;  Surgeon: Jarvis Garcia MD;  [facility] END[facility];  Service:    • COLONOSCOPY N/A 12/21/2020    Procedure: COLONOSCOPY to cecum with cold snare polypectomies;  Surgeon: Celio Joy MD;  [facility] END[facility];  Service: Gastroenterology;  Laterality: N/A;  pre  - hx of colon polyps  post  - polyps, internal hemorrhoids   • ENDOSCOPY     • ENDOSCOPY N/A 7/25/2016    Procedure: ESOPHAGOGASTRODUODENOSCOPY WITH DILATATION;  Surgeon: Jarvis Garcia MD;  [facility] END[facility];  Service:    • FOOT SURGERY      right   • KNEE ARTHROSCOPY      right   • LASIK     • MEDIASTINAL MASS EXCISION  01/12/2017    Dr Hernández   • PERICARDIECTOMY  01/12/2017    Dr Hernández   • VASECTOMY         Social History:  Social History     Socioeconomic History   • Marital status:      Spouse name: Chloé   • Number of children: Not on file   • Years of education: Not on file   • Highest education level: Not on file   Occupational History   • Occupation: Behavorial health     Employer: [employer]   Tobacco Use   • Smoking status: Never Smoker   • Smokeless tobacco: Never Used   Substance and Sexual Activity   • Alcohol use: Yes     Alcohol/week: 2.0 standard drinks     Types: 1 Glasses of wine, 1 Cans of beer per week   • Drug use: No   • Sexual activity: Yes     Partners: Female     Birth control/protection: Surgical        Review of Systems:  The following systems were reviewed as they relate to the cardiovascular system: Constitutional, Eyes, ENT, Cardiovascular, Respiratory, Gastrointestinal, Integumentary, Neurological, Psychiatric, Hematologic, Endocrine, Musculoskeletal, and Genitourinary. The pertinent cardiovascular findings are reported above with all other cardiovascular points within those systems being negative.    Diagnostic Study Review:     Current Electrocardiogram:    ECG 12 Lead    Date/Time: 1/7/2021 6:33 PM  Performed by: Josue Chatterjee DO  Authorized by: Josue Chatterjee DO   Comparison: not compared with previous ECG   Previous ECG: no previous ECG available  Comments: Normal sinus rhythm with a ventricular rate of 77 bpm.  Normal QT and QTc intervals.  Normal QRS axis.              NOTE: The following portions of the patient's history were reviewed and updated this visit as appropriate: allergies, current medications, past family history, past medical history, past social history, past surgical history and problem list.

## 2021-01-07 NOTE — TELEPHONE ENCOUNTER
Call to pt.  Advise per Dr Joy note.  Verb understanding.     C/s for 12/21/23 placed in recall and HM.

## 2021-01-07 NOTE — TELEPHONE ENCOUNTER
----- Message from Celio Joy MD sent at 12/27/2020  8:19 PM EST -----  12/27/20  Tell him that the colon polyps that were removed were not cancerous but many were precancerous. I recommend a repeat colonoscopy in 3 yrs. FAX to his PCP.   Darshanx. kjh

## 2021-01-08 ENCOUNTER — HOSPITAL ENCOUNTER (OUTPATIENT)
Dept: PET IMAGING | Facility: HOSPITAL | Age: 56
Discharge: HOME | End: 2021-01-08

## 2021-01-08 DIAGNOSIS — C71.9 PNET (PRIMITIVE NEUROECTODERMAL TUMOR) (HCC): ICD-10-CM

## 2021-01-08 PROBLEM — E78.2 MIXED HYPERLIPIDEMIA: Status: ACTIVE | Noted: 2021-01-08

## 2021-01-08 LAB — GLUCOSE BLDC GLUCOMTR-MCNC: 99 MG/DL (ref 70–130)

## 2021-01-08 PROCEDURE — 78815 PET IMAGE W/CT SKULL-THIGH: CPT

## 2021-01-08 PROCEDURE — 0 FLUDEOXYGLUCOSE F18 SOLUTION: Performed by: INTERNAL MEDICINE

## 2021-01-08 PROCEDURE — A9552 F18 FDG: HCPCS | Performed by: INTERNAL MEDICINE

## 2021-01-08 PROCEDURE — 82962 GLUCOSE BLOOD TEST: CPT

## 2021-01-08 RX ADMIN — FLUDEOXYGLUCOSE F18 1 DOSE: 300 INJECTION INTRAVENOUS at 08:55

## 2021-01-13 NOTE — PROGRESS NOTES
Subjective     REASON FOR FOLLOWUP:    1. 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  Relative lymphocytosis negative flow cytometry  3.  Close follow-up planned per recommendation of Dr. Gonzalez at [facility]  4.    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  He comes in today for repeat scan and he is doing very well almost 4 years from diagnosis.      He has no chest pain cough or systemic complaints.  He has noticed no masses in his testicle- testicular ultrasound in July was negative    .  Alpha-fetoprotein and beta-hCG are normal so far  He is been to the dermatologist to show him the mole that I wanted checked out and they're keeping a close eye on this    He is due for colonoscopy this year and his old gastroenterologist has retired and I referred him to Dr. Celio Londono colonoscopy in December showed 3 tubular adenomas    Cardiac evaluation and echo shows a decreased ejection fraction etiology unclear EF of 45 to 50%    His PET scan is essentially negative except for mild increased uptake in left axillary node compared to previously without a change in size.  On further questioning he had his COVID-19 vaccine in the left upper arm 1 week before his PET scan and I am almost sure this is the cause of the increased uptake in the node-to be safe however we will repeat a chest CT in 3 months      PET scan will be done in 6 months because of his severe contrast allergy and annually through year5 per the recommendations of  Dr. Gonzalez    Past Medical History:   Diagnosis Date   • Allergic rhinitis    • Anxiety    • Asthma     controlled   • H/O Mediastinal mass    • History of colon polyps    • Hyperlipidemia    • Low iron    • Palpitations    • PNET (primitive neuroectodermal tumor) (CMS/HCC) 2017   • Tachycardia    • Teratoma     s/p surgical excision - no evidence of malignancy noted         Past Surgical History:   Procedure Laterality Date   • COLONOSCOPY     • COLONOSCOPY N/A 7/25/2016    Procedure: COLONOSCOPY INTO CECUM/TERMINAL ILEUM WITH POLYPECTOMY;  Surgeon: Jarvis Garcia MD;  Location: [facility] ENDOSCOPY;  Service:    • COLONOSCOPY N/A 12/21/2020    Procedure: COLONOSCOPY to cecum with cold snare polypectomies;  Surgeon: Celio Joy MD;  Location: [facility] ENDOSCOPY;  Service: Gastroenterology;  Laterality: N/A;  pre  - hx of colon polyps  post  - polyps, internal hemorrhoids   • ENDOSCOPY     • ENDOSCOPY N/A 7/25/2016    Procedure: ESOPHAGOGASTRODUODENOSCOPY WITH DILATATION;  Surgeon: Jarvis Garcia MD;  Location: [facility] ENDOSCOPY;  Service:    • FOOT SURGERY      right   • KNEE ARTHROSCOPY      right   • LASIK     • MEDIASTINAL MASS EXCISION  01/12/2017    Dr Hernández   • PERICARDIECTOMY  01/12/2017    Dr Hernández   • VASECTOMY        Oncologic history   patient is a 51-year-old male who presented to his family doctor in September 2015 with a cough that developed while he was lying on his back.  Patient was felt to have allergies and given some medications for this but the symptoms don't improve.  He was then referred to ENT who evaluated him and scoped him and thought he had reflux symptoms and gave her medications for this.  Patient still do not get better once back to see the ENT doctor was CAT scan the sinuses and thought he may have some sinus problems.  He was then referred to an allergist who diagnosed some allergies and gave her medications again with no significant improvement.  He is treated for asthma and  again did not improve.  Finally when his symptoms worsened a chest CT was done the end of December which showed a 16 x 10 x 11 cm anterior mediastinal mass with no parenchymal nodules or adenopathy in the chest or abdomen.  Patient saw Dr. Hernández who ordered beta hCG and alpha-fetoprotein which were normal and then proceeded with a resection of the tumor which was done on 1/12/17 .    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  The tumor was lifted off the great vessels and apparently was completely resected in toto.  The cut surface of the tumor showed multiple cysts with very little solid tissue present between cystic lesions in numerous areas of possible calcification was identified .  The slides were reviewed at [facility] who confirmed the diagnosis.    postop really the patient has done very well and his breathing and discomfort have dramatically improved and he stopped most of his inhalers and asthma medications.  He's had no weight loss headache or other systemic complaints.  The patient reports chronic back pain due to an injury to L4-L5 and S1 many years ago and some frequency of urination at night but otherwise feels very well and actually moved into a new home 2 weeks before his surgery.     He was sent to [facility] see Dr. Taj Gonzalez for an opinion regarding adjuvant treatment and Dr. Gonzalez reviewed his case and recommended close follow-up with CAT scans every 2 months for the first 6 months and then less frequently thereafter with plans for re-surgery if there is abnormality detected and then adjuvant treatment with CAV alternating with ifosfamide and  etoposide.  Flow cytometry of his peripheral blood was unremarkable    5/17  patient is a 51-year-old male with recent diagnosis of a mediastinal germ cell tumor which is predominately teratoma with a focus of primitive neuroectodermal tumor.  He was sent to [facility] see Dr. Taj Gonzalez for an opinion regarding adjuvant treatment and Dr. Gonzalez reviewed his case and recommended close follow-up with CAT scans every 2 months for the first 6 months and then less frequently thereafter with plans for re-surgery if there is abnormality detected and then adjuvant treatment with CAV alternating with ifosfamide and etoposide.  Con is back to review his CAT scans and overall is feeling well.  He is exercising but not able to lose weight and is actually gained a little weight which she surprised by.  His white count shows a 22% eosinophilia which is new but he tells me he is resumed his allergy shots last month and this may explain it.  He has no other symptoms at this time  I reviewed his CAT scans and they show no change with some insignificant mediastinal nodes and a small lung nodule that was present prior to his original surgery and 2 hemangiomas in the liver that are stable.  AFP and beta hCG 2 months ago were normal and results are pending today.  7/17  Scans negative patient seen by the nurse practitioner in Dr. Cardoso's absence-tumor markers normal  9/17    His scans in July were fine but I was unavailable that day because of a medical emergency and the nurse practitioner reviewed the scans.  He was complaining of tenderness in the scrotum and an ultrasound was done which was thankfully benign and the symptoms resolved.  Since then he has been to see Dr. Gonzalez and unfortunately due to our  new EMR notes are not routinely forwarded to referring doctors and I have made an effort to have the notes sent to Dr. Gonzalez today along with the disks from his scans and thankfully his CAT scan from last week also  is completely benign.  His tumor markers are pending from today but have been negative so far.  Dr. Gonzalez has recommended moving to 6 months  imaging the patient is still very anxious about recurrence of his disease and I tried to reassure him. we'll get one more scan in 3-4 months before moving to 6 months scans.    1/20    We set him up for genetic testing because of his colon polyps his atypical nevi and his testicular cancer and some unusual family history and it showed a V US in the kit gene but nothing else significant      Current Outpatient Medications on File Prior to Visit   Medication Sig Dispense Refill   • aspirin 81 MG chewable tablet Chew 81 mg Daily.     • atorvastatin (LIPITOR) 20 MG tablet Take 1 tablet by mouth Daily. 90 tablet 3   • cetirizine (zyrTEC) 10 MG tablet Take 10 mg by mouth Daily.     • Coenzyme Q10 (CoQ10) 100 MG capsule Take  by mouth.     • doxycycline (VIBRAMYCIN) 100 MG capsule TK 1 C PO BID WF     • Dulera 200-5 MCG/ACT inhaler USE 2 PUFFS BID     • fluticasone (FLONASE) 50 MCG/ACT nasal spray 2 sprays into each nostril Daily.     • oxybutynin (DITROPAN) 5 MG tablet Take 5 mg by mouth 2 (two) times a day.       No current facility-administered medications on file prior to visit.         ALLERGIES:    Allergies   Allergen Reactions   • Contrast Dye Hives     Pt had hives all over chest and itchy palms.    Pt was pre-medicated prior to this scan.   • Tape Hives     Surgical tape        Social History     Socioeconomic History   • Marital status:      Spouse name: Chloé   • Number of children: Not on file   • Years of education: Not on file   • Highest education level: Not on file   Occupational History   • Occupation: Behavorial health     Employer: [employer]   Tobacco Use   • Smoking status: Never Smoker   • Smokeless tobacco: Never Used   Substance and Sexual Activity   • Alcohol use: Yes     Alcohol/week: 2.0 standard drinks     Types: 1 Glasses  "of wine, 1 Cans of beer per week   • Drug use: No   • Sexual activity: Yes     Partners: Female     Birth control/protection: Surgical        Family History   Problem Relation Age of Onset   • Heart failure Father    • Hypertension Father    • Cancer Maternal Aunt     parents are in their 70s and healthy his one sister is healthy and 3 children are healthy maternal aunt had an unusual cancer in the gallbladder in her 40s his paternal grandmother had malignancy in her 40s of unknown type    Review of Systems   Constitutional: Negative.  Negative for fatigue.   HENT: Negative for mouth sores and sore throat.    Respiratory: Negative for chest tightness and shortness of breath.    Cardiovascular: Negative for chest pain.   Gastrointestinal: Negative for abdominal pain, constipation, diarrhea, nausea and vomiting.   Genitourinary: Negative for difficulty urinating.   Musculoskeletal: Negative for back pain.   Neurological: Negative for dizziness.   Psychiatric/Behavioral: Negative for sleep disturbance. The patient is not nervous/anxious.    All other systems reviewed and are negative.       Objective     Vitals:    01/14/21 1319   BP: 115/81   Pulse: 85   Resp: 16   Temp: 98.2 °F (36.8 °C)   TempSrc: Skin   SpO2: 98%   Weight: 78.5 kg (173 lb 1.6 oz)   Height: 178 cm (70.08\")   PainSc: 0-No pain     Current Status 1/14/2021   ECOG score 0       Physical Exam    GENERAL:  Well-developed, well-nourished in no acute distress.   SKIN:  Warm, dry without rashes, purpura or petechiae.Numerous atypical nevi noted   EYES:  Pupils equal, round and reactive to light.  EOMs intact.  Conjunctivae normal.  EARS:  Hearing intact.  NOSE:  Septum midline.  No excoriations or nasal discharge.  MOUTH:  Tongue is well-papillated; no stomatitis or ulcers.  Lips normal.  THROAT:  Oropharynx without lesions or exudates.  NECK:  Supple with good range of motion; no thyromegaly or masses, no JVD.  LYMPHATICS:  No cervical, supraclavicular, " axillary or inguinal adenopathy.  No left axillary nodes  CHEST:  Lungs clear to auscultation. Good airflow.  Sternotomy incision is well-healed  CARDIAC:  Regular rate and rhythm without murmurs, rubs or gallops. Normal S1,S2.  ABDOMEN:  Soft, nontender with no hepatosplenomegaly or masses.  Testicular exam deferred  EXTREMITIES:  No clubbing, cyanosis or edema.  NEUROLOGICAL:  Cranial Nerves II-XII grossly intact.  No focal neurological deficits.  PSYCHIATRIC:  Normal affect and mood.    I have reexamined the patient and the results are consistent with the previously documented exam. Thanh Cardoso MD       RECENT LABS:  Hematology WBC   Date Value Ref Range Status   01/14/2021 5.03 3.40 - 10.80 10*3/mm3 Final     RBC   Date Value Ref Range Status   01/14/2021 4.67 4.14 - 5.80 10*6/mm3 Final     Hemoglobin   Date Value Ref Range Status   01/14/2021 14.5 13.0 - 17.7 g/dL Final     Hematocrit   Date Value Ref Range Status   01/14/2021 41.4 37.5 - 51.0 % Final     Platelets   Date Value Ref Range Status   01/14/2021 213 140 - 450 10*3/mm3 Final        AFP and beta hCG are normal in 12/18        SCROTAL SONOGRAPHY WITH TESTICULAR DOPPLER     FINDINGS: Sonographic evaluation of the scrotal contents was performed.  The right testicle measures 4.5 x 2.6 x 2 cm and the left testicle  measures 4.2 x 3.2 x 1.9 cm. There are punctate echogenic foci seen in  both testes on the order of 2-3 in number. This is consistent with  minimal bilateral testicular microlithiasis. Normal vascularity is seen  within both testes. There is a 0.7 cm right epididymal head cyst versus  hematocele. A 1.6 cm left epididymal head cyst versus spermatocele is  also noted. No other abnormality is appreciated.     IMPRESSION:  1. Minimal bilateral testicular microlithiasis with approximately 2-3  punctate calcifications seen in both testes. The relative paucity of  microcalcifications argue against any active abnormality at this time.  However,  follow-up scrotal sonography in one year is recommended.  2. Bilateral epididymal head cysts versus spermatoceles as described.   This report was finalized on 7/23/2018 9        Study Result     IMPRESSION:  1.  There is a left axillary node which while grossly unchanged in size  since 2018, it has increased in FDG uptake and is now mildly above that  of blood pool. While findings are favored be reactive given its overall  size stability, they remain indeterminate given patient history and  follow-up with chest CT with intravenous contrast in 3 months is  recommended to ensure stability and exclude early metastatic disease.  2.  No findings of new FDG avid disease within the neck, abdomen or  pelvis.  3.  Other findings as above.     This report was finalized on 1/11/2021                 Assessment/Plan     1.  Mediastinal germ cell tumor-  Completely resected 1/17 with normal preop alpha fetoprotein beta-hCG and a focus of primitive neuroectodermal tumor in the resected specimen which is predominantly mature teratoma  · Negative PET scan as of 11/19- 3  years from diagnosis  · PET scan in 1/20 with increased uptake left axillary node most likely due to COVID-19 vaccination in that arm 1 week before PET scan-tumor markers negative    2.  Eosinophilia probably due to  allergy shots -resolved  3.  Atypical nevi evaluated by dermatology   4.  .  Tubular adenoma on colonoscopy in [id number] in 3 years  · 3 tubular adenomas on colonoscopy in 12/20  5  Significant allergic rash to IV contrast in 6/18 while on steroids-no further IV contrast as far as possible  6.  Genetic testing negative except for a VUS in the KIT gene    Plan  1.  return in 6  months with  afp /bhcg and PET scan and MD visit  2.  Testicular ultrasound annually due in July 2021  3.  Scans will be done annually for the next years   4.  CT chest without contrast in 3 months to follow-up on the reactive axillary node

## 2021-01-14 ENCOUNTER — LAB (OUTPATIENT)
Dept: LAB | Facility: HOSPITAL | Age: 56
End: 2021-01-14

## 2021-01-14 ENCOUNTER — OFFICE VISIT (OUTPATIENT)
Dept: ONCOLOGY | Facility: CLINIC | Age: 56
End: 2021-01-14

## 2021-01-14 VITALS
HEART RATE: 85 BPM | WEIGHT: 173.1 LBS | SYSTOLIC BLOOD PRESSURE: 115 MMHG | DIASTOLIC BLOOD PRESSURE: 81 MMHG | RESPIRATION RATE: 16 BRPM | BODY MASS INDEX: 24.78 KG/M2 | HEIGHT: 70 IN | TEMPERATURE: 98.2 F | OXYGEN SATURATION: 98 %

## 2021-01-14 DIAGNOSIS — C71.9 PNET (PRIMITIVE NEUROECTODERMAL TUMOR) (HCC): Primary | ICD-10-CM

## 2021-01-14 DIAGNOSIS — Z86.010 HISTORY OF COLON POLYPS: ICD-10-CM

## 2021-01-14 LAB
ALBUMIN SERPL-MCNC: 4.2 G/DL (ref 3.5–5.2)
ALBUMIN/GLOB SERPL: 1.8 G/DL (ref 1.1–2.4)
ALP SERPL-CCNC: 88 U/L (ref 38–116)
ALPHA-FETOPROTEIN: 2.75 NG/ML (ref 0–8.3)
ALT SERPL W P-5'-P-CCNC: 48 U/L (ref 0–41)
ANION GAP SERPL CALCULATED.3IONS-SCNC: 10.8 MMOL/L (ref 5–15)
AST SERPL-CCNC: 29 U/L (ref 0–40)
BASOPHILS # BLD AUTO: 0.03 10*3/MM3 (ref 0–0.2)
BASOPHILS NFR BLD AUTO: 0.6 % (ref 0–1.5)
BILIRUB SERPL-MCNC: 0.4 MG/DL (ref 0.2–1.2)
BUN SERPL-MCNC: 14 MG/DL (ref 6–20)
BUN/CREAT SERPL: 13.9 (ref 7.3–30)
CALCIUM SPEC-SCNC: 9.2 MG/DL (ref 8.5–10.2)
CHLORIDE SERPL-SCNC: 105 MMOL/L (ref 98–107)
CO2 SERPL-SCNC: 26.2 MMOL/L (ref 22–29)
CREAT SERPL-MCNC: 1.01 MG/DL (ref 0.7–1.3)
DEPRECATED RDW RBC AUTO: 38.5 FL (ref 37–54)
EOSINOPHIL # BLD AUTO: 0.1 10*3/MM3 (ref 0–0.4)
EOSINOPHIL NFR BLD AUTO: 2 % (ref 0.3–6.2)
ERYTHROCYTE [DISTWIDTH] IN BLOOD BY AUTOMATED COUNT: 12 % (ref 12.3–15.4)
GFR SERPL CREATININE-BSD FRML MDRD: 76 ML/MIN/1.73
GLOBULIN UR ELPH-MCNC: 2.4 GM/DL (ref 1.8–3.5)
GLUCOSE SERPL-MCNC: 124 MG/DL (ref 74–124)
HCT VFR BLD AUTO: 41.4 % (ref 37.5–51)
HGB BLD-MCNC: 14.5 G/DL (ref 13–17.7)
IMM GRANULOCYTES # BLD AUTO: 0.01 10*3/MM3 (ref 0–0.05)
IMM GRANULOCYTES NFR BLD AUTO: 0.2 % (ref 0–0.5)
LYMPHOCYTES # BLD AUTO: 2.03 10*3/MM3 (ref 0.7–3.1)
LYMPHOCYTES NFR BLD AUTO: 40.4 % (ref 19.6–45.3)
MCH RBC QN AUTO: 31 PG (ref 26.6–33)
MCHC RBC AUTO-ENTMCNC: 35 G/DL (ref 31.5–35.7)
MCV RBC AUTO: 88.7 FL (ref 79–97)
MONOCYTES # BLD AUTO: 0.35 10*3/MM3 (ref 0.1–0.9)
MONOCYTES NFR BLD AUTO: 7 % (ref 5–12)
NEUTROPHILS NFR BLD AUTO: 2.51 10*3/MM3 (ref 1.7–7)
NEUTROPHILS NFR BLD AUTO: 49.8 % (ref 42.7–76)
NRBC BLD AUTO-RTO: 0 /100 WBC (ref 0–0.2)
PLATELET # BLD AUTO: 213 10*3/MM3 (ref 140–450)
PMV BLD AUTO: 8.5 FL (ref 6–12)
POTASSIUM SERPL-SCNC: 3.9 MMOL/L (ref 3.5–4.7)
PROT SERPL-MCNC: 6.6 G/DL (ref 6.3–8)
RBC # BLD AUTO: 4.67 10*6/MM3 (ref 4.14–5.8)
SODIUM SERPL-SCNC: 142 MMOL/L (ref 134–145)
WBC # BLD AUTO: 5.03 10*3/MM3 (ref 3.4–10.8)

## 2021-01-14 PROCEDURE — 85025 COMPLETE CBC W/AUTO DIFF WBC: CPT

## 2021-01-14 PROCEDURE — 36415 COLL VENOUS BLD VENIPUNCTURE: CPT

## 2021-01-14 PROCEDURE — 82105 ALPHA-FETOPROTEIN SERUM: CPT | Performed by: INTERNAL MEDICINE

## 2021-01-14 PROCEDURE — 99214 OFFICE O/P EST MOD 30 MIN: CPT | Performed by: INTERNAL MEDICINE

## 2021-01-14 PROCEDURE — 80053 COMPREHEN METABOLIC PANEL: CPT

## 2021-01-15 LAB — HCG INTACT+B SERPL-ACNC: <1 MIU/ML (ref 0–3)

## 2021-04-09 ENCOUNTER — HOSPITAL ENCOUNTER (OUTPATIENT)
Dept: PET IMAGING | Facility: HOSPITAL | Age: 56
Discharge: HOME | End: 2021-04-09
Admitting: INTERNAL MEDICINE

## 2021-04-09 DIAGNOSIS — C71.9 PNET (PRIMITIVE NEUROECTODERMAL TUMOR) (HCC): ICD-10-CM

## 2021-04-09 PROCEDURE — 71250 CT THORAX DX C-: CPT

## 2021-08-02 ENCOUNTER — TELEPHONE (OUTPATIENT)
Dept: ONCOLOGY | Facility: CLINIC | Age: 56
End: 2021-08-02

## 2021-08-02 ENCOUNTER — TELEMEDICINE (OUTPATIENT)
Dept: ONCOLOGY | Facility: CLINIC | Age: 56
End: 2021-08-02

## 2021-08-02 DIAGNOSIS — C71.9 PNET (PRIMITIVE NEUROECTODERMAL TUMOR) (HCC): Primary | ICD-10-CM

## 2021-08-02 PROCEDURE — 99214 OFFICE O/P EST MOD 30 MIN: CPT | Performed by: INTERNAL MEDICINE

## 2021-08-02 NOTE — TELEPHONE ENCOUNTER
Caller: TONYA FELIPE    Relationship: SELF    Best call back number:[phone]    PT IS SCHEDULED FOR A [facility] VIDEO VISIT, AND IT HAS BEEN 15 MIN PAST APPT TIME. PT WAS CALLING TO SEE IF DR MARINA WAS RUNNING BEHIND. SPOKE WITH OFFICE, THEY ADVISED TO LET PT KNOW DR MARINA WILL BE ON, AND IS JUST RUNNING BEHIND

## 2021-08-02 NOTE — PROGRESS NOTES
Subjective     REASON FOR FOLLOWUP:    1. 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  Relative lymphocytosis negative flow cytometry  3.  Close follow-up planned per recommendation of Dr. Gonzalez at [facility]  4.    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      He is doing a video visit today because he had a conflict with work but he is doing well has no complaints.    We did a follow-up chest CT of the chest because of a PET positive axillary node which I felt was related to his Covid vaccination few weeks prior to this and indeed the noted decrease in size but they reported some nodular scarring in the left upper lobe and recommended a follow-up chest CT and we will do this next month.    He has no complaints and is doing well otherwise he is decided not to go for his testicular ultrasound and I think this is fine    He has no chest pain cough or systemic complaints.  He has noticed no masses in his testicle-     .  Alpha-fetoprotein and beta-hCG are normal so far    He is been to the dermatologist to show him the mole that I wanted checked out and they're keeping a close eye on this          Past Medical History:   Diagnosis Date   • Allergic rhinitis    • Anxiety    • Asthma     controlled   • H/O Mediastinal mass    • History of colon polyps    • Hyperlipidemia    • Low iron    • Palpitations    • PNET (primitive neuroectodermal tumor) (CMS/HCC) 2017   • Tachycardia    • Teratoma     s/p surgical excision - no  evidence of malignancy noted         Past Surgical History:   Procedure Laterality Date   • COLONOSCOPY     • COLONOSCOPY N/A 7/25/2016    Procedure: COLONOSCOPY INTO CECUM/TERMINAL ILEUM WITH POLYPECTOMY;  Surgeon: Jarvis Garcia MD;  Location: [facility] ENDOSCOPY;  Service:    • COLONOSCOPY N/A 12/21/2020    Procedure: COLONOSCOPY to cecum with cold snare polypectomies;  Surgeon: Celio Joy MD;  Location: [facility] ENDOSCOPY;  Service: Gastroenterology;  Laterality: N/A;  pre  - hx of colon polyps  post  - polyps, internal hemorrhoids   • ENDOSCOPY     • ENDOSCOPY N/A 7/25/2016    Procedure: ESOPHAGOGASTRODUODENOSCOPY WITH DILATATION;  Surgeon: Jarvis Garcia MD;  Location: [facility] ENDOSCOPY;  Service:    • FOOT SURGERY      right   • KNEE ARTHROSCOPY      right   • LASIK     • MEDIASTINAL MASS EXCISION  01/12/2017    Dr Hernández   • PERICARDIECTOMY  01/12/2017    Dr Hernández   • VASECTOMY        Oncologic history   patient is a 51-year-old male who presented to his family doctor in September 2015 with a cough that developed while he was lying on his back.  Patient was felt to have allergies and given some medications for this but the symptoms don't improve.  He was then referred to ENT who evaluated him and scoped him and thought he had reflux symptoms and gave her medications for this.  Patient still do not get better once back to see the ENT doctor was CAT scan the sinuses and thought he may have some sinus problems.  He was then referred to an allergist who diagnosed some allergies and gave her medications again with no significant improvement.  He is treated for asthma and again did not improve.  Finally when his symptoms worsened a chest CT was done the end of December which showed a 16 x 10 x 11 cm anterior mediastinal mass with no parenchymal nodules or adenopathy in the chest or abdomen.  Patient saw Dr. Hernández who ordered beta hCG and alpha-fetoprotein which were normal and then proceeded with a resection of  the tumor which was done on 1/12/17 .    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  The tumor was lifted off the great vessels and apparently was completely resected in toto.  The cut surface of the tumor showed multiple cysts with very little solid tissue present between cystic lesions in numerous areas of possible calcification was identified .  The slides were reviewed at [facility] who confirmed the diagnosis.    postop really the patient has done very well and his breathing and discomfort have dramatically improved and he stopped most of his inhalers and asthma medications.  He's had no weight loss headache or other systemic complaints.  The patient reports chronic back pain due to an injury to L4-L5 and S1 many years ago and some frequency of urination at night but otherwise feels very well and actually moved into a new home 2 weeks before his surgery.     He was sent to [facility] see Dr. Taj Gonzalez for an opinion regarding adjuvant treatment and Dr. Gonzalez reviewed his case and recommended close follow-up with CAT scans every 2 months for the first 6 months and then less frequently thereafter with plans for re-surgery if there is abnormality detected and then adjuvant treatment with CAV alternating with ifosfamide and etoposide.  Flow cytometry of his peripheral blood was unremarkable    5/17  patient is a 51-year-old male with recent diagnosis of a mediastinal germ cell tumor which is predominately teratoma with a focus of primitive neuroectodermal tumor.  He was sent to [facility] see Dr. Taj Gonzalez for an opinion regarding adjuvant treatment and   Carlos reviewed his case and recommended close follow-up with CAT scans every 2 months for the first 6 months and then less frequently thereafter with plans for re-surgery if there is abnormality detected and then adjuvant treatment with CAV alternating with ifosfamide and etoposide.  Con is back to review his CAT scans and overall is feeling well.  He is exercising but not able to lose weight and is actually gained a little weight which she surprised by.  His white count shows a 22% eosinophilia which is new but he tells me he is resumed his allergy shots last month and this may explain it.  He has no other symptoms at this time  I reviewed his CAT scans and they show no change with some insignificant mediastinal nodes and a small lung nodule that was present prior to his original surgery and 2 hemangiomas in the liver that are stable.  AFP and beta hCG 2 months ago were normal and results are pending today.  7/17  Scans negative patient seen by the nurse practitioner in Dr. Cardoso's absence-tumor markers normal  9/17    His scans in July were fine but I was unavailable that day because of a medical emergency and the nurse practitioner reviewed the scans.  He was complaining of tenderness in the scrotum and an ultrasound was done which was thankfully benign and the symptoms resolved.  Since then he has been to see Dr. Gonzalez and unfortunately due to our  new EMR notes are not routinely forwarded to referring doctors and I have made an effort to have the notes sent to Dr. Gonzalez today along with the disks from his scans and thankfully his CAT scan from last week also is completely benign.  His tumor markers are pending from today but have been negative so far.  Dr. Gonzalez has recommended moving to 6 months  imaging the patient is still very anxious about recurrence of his disease and I tried to reassure him. we'll get one more scan in 3-4 months before moving to 6 months scans.    1/20    We set him up for  genetic testing because of his colon polyps his atypical nevi and his testicular cancer and some unusual family history and it showed a V US in the kit gene but nothing else significant    1/21  He is due for colonoscopy this year and his old gastroenterologist has retired and I referred him to Dr. Celio Londono colonoscopy in December showed 3 tubular adenomas    Cardiac evaluation and echo shows a decreased ejection fraction etiology unclear EF of 45 to 50%    His PET scan is essentially negative except for mild increased uptake in left axillary node compared to previously without a change in size.  On further questioning he had his COVID-19 vaccine in the left upper arm 1 week before his PET scan and I am almost sure this is the cause of the increased uptake in the node-to be safe however we will repeat a chest CT in 3 months      PET scan will be done in 6 months because of his severe contrast allergy and annually through year5 per the recommendations of Dr. Gonzalez      Current Outpatient Medications on File Prior to Visit   Medication Sig Dispense Refill   • aspirin 81 MG chewable tablet Chew 81 mg Daily.     • atorvastatin (LIPITOR) 20 MG tablet Take 1 tablet by mouth Daily. 90 tablet 3   • cetirizine (zyrTEC) 10 MG tablet Take 10 mg by mouth Daily.     • Coenzyme Q10 (CoQ10) 100 MG capsule Take  by mouth.     • doxycycline (VIBRAMYCIN) 100 MG capsule TK 1 C PO BID WF     • Dulera 200-5 MCG/ACT inhaler USE 2 PUFFS BID     • fluticasone (FLONASE) 50 MCG/ACT nasal spray 2 sprays into each nostril Daily.     • oxybutynin (DITROPAN) 5 MG tablet Take 5 mg by mouth 2 (two) times a day.       No current facility-administered medications on file prior to visit.        ALLERGIES:    Allergies   Allergen Reactions   • Contrast Dye Hives     Pt had hives all over chest and itchy palms.    Pt was pre-medicated prior to this scan.   • Tape Hives     Surgical tape        Social History     Socioeconomic History   • Marital  status:      Spouse name: Chloé   • Number of children: Not on file   • Years of education: Not on file   • Highest education level: Not on file   Tobacco Use   • Smoking status: Never Smoker   • Smokeless tobacco: Never Used   Vaping Use   • Vaping Use: Never used   Substance and Sexual Activity   • Alcohol use: Yes     Alcohol/week: 2.0 standard drinks     Types: 1 Glasses of wine, 1 Cans of beer per week   • Drug use: No   • Sexual activity: Yes     Partners: Female     Birth control/protection: Surgical        Family History   Problem Relation Age of Onset   • Heart failure Father    • Hypertension Father    • Cancer Maternal Aunt     parents are in their 70s and healthy his one sister is healthy and 3 children are healthy maternal aunt had an unusual cancer in the gallbladder in her 40s his paternal grandmother had malignancy in her 40s of unknown type    Review of Systems   Constitutional: Negative.  Negative for fatigue.   HENT: Negative for mouth sores and sore throat.    Respiratory: Negative for chest tightness and shortness of breath.    Cardiovascular: Negative for chest pain.   Gastrointestinal: Negative for abdominal pain, constipation, diarrhea, nausea and vomiting.   Genitourinary: Negative for difficulty urinating.   Musculoskeletal: Negative for back pain.   Neurological: Negative for dizziness.   Psychiatric/Behavioral: Negative for sleep disturbance. The patient is not nervous/anxious.    All other systems reviewed and are negative.       Objective     There were no vitals filed for this visit.  Current Status 1/14/2021   ECOG score 0       Physical Exam    GENERAL:  Well-developed, well-nourished in no acute distress.   SKIN:  Warm, dry without rashes, purpura or petechiae.Numerous atypical nevi noted   EYES:  Pupils equal, round and reactive to light.  EOMs intact.  Conjunctivae normal.  EARS:  Hearing intact.  NOSE:  Septum midline.  No excoriations or nasal discharge.  MOUTH:  Tongue  is well-papillated; no stomatitis or ulcers.  Lips normal.  THROAT:  Oropharynx without lesions or exudates.  NECK:  Supple with good range of motion; no thyromegaly or masses, no JVD.  LYMPHATICS:  No cervical, supraclavicular, axillary or inguinal adenopathy.  No left axillary nodes  CHEST:  Lungs clear to auscultation. Good airflow.  Sternotomy incision is well-healed  CARDIAC:  Regular rate and rhythm without murmurs, rubs or gallops. Normal S1,S2.  ABDOMEN:  Soft, nontender with no hepatosplenomegaly or masses.  Testicular exam deferred  EXTREMITIES:  No clubbing, cyanosis or edema.  NEUROLOGICAL:  Cranial Nerves II-XII grossly intact.  No focal neurological deficits.  PSYCHIATRIC:  Normal affect and mood.    I have reexamined the patient and the results are consistent with the previously documented exam. Thanh Cardoso MD       RECENT LABS:  Hematology WBC   Date Value Ref Range Status   01/14/2021 5.03 3.40 - 10.80 10*3/mm3 Final     RBC   Date Value Ref Range Status   01/14/2021 4.67 4.14 - 5.80 10*6/mm3 Final     Hemoglobin   Date Value Ref Range Status   01/14/2021 14.5 13.0 - 17.7 g/dL Final     Hematocrit   Date Value Ref Range Status   01/14/2021 41.4 37.5 - 51.0 % Final     Platelets   Date Value Ref Range Status   01/14/2021 213 140 - 450 10*3/mm3 Final        AFP and beta hCG are normal in 12/18        SCROTAL SONOGRAPHY WITH TESTICULAR DOPPLER     FINDINGS: Sonographic evaluation of the scrotal contents was performed.  The right testicle measures 4.5 x 2.6 x 2 cm and the left testicle  measures 4.2 x 3.2 x 1.9 cm. There are punctate echogenic foci seen in  both testes on the order of 2-3 in number. This is consistent with  minimal bilateral testicular microlithiasis. Normal vascularity is seen  within both testes. There is a 0.7 cm right epididymal head cyst versus  hematocele. A 1.6 cm left epididymal head cyst versus spermatocele is  also noted. No other abnormality is appreciated.      IMPRESSION:  1. Minimal bilateral testicular microlithiasis with approximately 2-3  punctate calcifications seen in both testes. The relative paucity of  microcalcifications argue against any active abnormality at this time.  However, follow-up scrotal sonography in one year is recommended.  2. Bilateral epididymal head cysts versus spermatoceles as described.   This report was finalized on 7/23/2018 9        Study Result     IMPRESSION:  1.  There is a left axillary node which while grossly unchanged in size  since 2018, it has increased in FDG uptake and is now mildly above that  of blood pool. While findings are favored be reactive given its overall  size stability, they remain indeterminate given patient history and  follow-up with chest CT with intravenous contrast in 3 months is  recommended to ensure stability and exclude early metastatic disease.  2.  No findings of new FDG avid disease within the neck, abdomen or  pelvis.  3.  Other findings as above.     This report was finalized on 1/11/2021                 Assessment/Plan     1.  Mediastinal germ cell tumor-  Completely resected 1/17 with normal preop alpha fetoprotein beta-hCG and a focus of primitive neuroectodermal tumor in the resected specimen which is predominantly mature teratoma  · Negative PET scan as of 11/19- 3  years from diagnosis  · PET scan in 1/21 with increased uptake left axillary node most likely due to COVID-19 vaccination in that arm 1 week before PET scan-tumor markers negative  · Follow-up CAT scan of the chest in 4/21 shows regression node size and questionable scarring left upper lobe follow-up scan planned in September    2.  Eosinophilia probably due to  allergy shots -resolved  3.  Atypical nevi evaluated by dermatology   4.  .  Tubular adenoma on colonoscopy in [id number] in 3 years  · 3 tubular adenomas on colonoscopy in 12/20  5  Significant allergic rash to IV contrast in 6/18 while on steroids-no further IV contrast  as far as possible  6.  Genetic testing negative except for a VUS in the KIT gene    Plan  1.  return in 6  months with  afp /bhcg and PET scan and MD visit  2.  CT chest without contrast next month call for the results     Video visit 20 minutes

## 2021-08-27 ENCOUNTER — TELEPHONE (OUTPATIENT)
Dept: ONCOLOGY | Facility: CLINIC | Age: 56
End: 2021-08-27

## 2021-08-27 NOTE — TELEPHONE ENCOUNTER
HUB TO READ    Left message for patient to call me at [phone], he missed his scan today.    Thanks  Juan M

## 2021-12-27 RX ORDER — ATORVASTATIN CALCIUM 20 MG/1
20 TABLET, FILM COATED ORAL DAILY
Qty: 90 TABLET | Refills: 3 | Status: SHIPPED | OUTPATIENT
Start: 2021-12-27 | End: 2022-09-30

## 2022-01-06 PROBLEM — I42.0 CARDIOMYOPATHY, DILATED (HCC): Status: ACTIVE | Noted: 2022-01-06

## 2022-01-06 PROBLEM — D15.2: Status: ACTIVE | Noted: 2022-01-06

## 2022-01-06 NOTE — PROGRESS NOTES
Cardiology Office Visit      Encounter Date:  01/07/2022    Patient ID:   Julien Turner is a 56 y.o. male.    Reason For Followup:  Nonischemic cardiomyopathy  History of mediastinal teratoma    Brief Clinical History:  Dear Dr. Byrne, Dusty Guevara MD    I had the pleasure of seeing Julien Turner today. As you are well aware, this is a 56 y.o. male with no established history of ischemic heart disease.  He does have a history of nonischemic cardiomyopathy with most recent ejection fraction being 45 to 50%.  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  He presents today for follow-up on the above conditions.     Interval History:  He denies any chest pain pressure heaviness or tightness.  He denies any shortness of breath.  He denies any PND orthopnea.  He denies any syncope or near syncope.  He reports feeling quite well from a cardiovascular perspective.    He reports that he is relocated to the greater [place] area.  He is in search of a new primary care physician.  Later this month he is going to have a PET scan.  This will represent 5 years since his teratoma removal.    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  He had reduced LV systolic function noted at the time of diagnosis which was felt to be representative of compressive physiology.  His ejection fraction since excision has essentially normalized.  His most recent echocardiogram demonstrated an EF of 45 to 50%.  This was performed in 2019.    He had a follow-up CT scan that demonstrated coronary calcification subsequently leading to a coronary calcium scoring.  His score was 135.8 with all of the calcium being located in the LAD and a low to moderate risk of 5-year events.  Because of this we added atorvastatin to his medical regimen.    He has been somewhat isolated since the COVID pandemic.  He no longer goes to the gym  "because of fear of transmission of COVID.  He reports he had an upper respiratory infection in December 2021.  Since then he has had a little bit of shortness of breath with exertion.    Because of his relocation to the greater [place] area I have encouraged him to find a primary care doctor in that area and if need be established with a cardiologist although he has had no significant cardiac symptomatology since his surgery.    His most recent laboratory studies were performed in May 2021.  These demonstrated total cholesterol of 139, triglycerides 160, HDL 38, and LDL 73.  Glucose value was 102.     Assessment & Plan     Impressions:  Cardiomyopathy with most recent ejection fraction 45 to 50%  History of mediastinal teratoma status post surgical excision.     Most recent PET scan negative for recurrent disease  Mild mitral insufficiency  Mild right ventricular enlargement     Recommendations:  Continuation of his current cardiovascular regimen at the present time.     This includes statin and antiplatelet therapy.  Follow-up with cardiology as needed.     If cardiology services are warranted, recommend finding someone closer to the [facility] for convenience and continuity of care.  Established with primary care physician for refills on atorvastatin.    Diagnoses and all orders for this visit:    1. Cardiomyopathy, dilated (HCC) (Primary)  -     ECG 12 Lead    2. Mixed hyperlipidemia  -     ECG 12 Lead    3. Benign mediastinal teratoma  -     ECG 12 Lead    4. Mediastinal mass  -     ECG 12 Lead    5. Teratoma  -     ECG 12 Lead    6. Decreased left ventricular systolic function  -     ECG 12 Lead          Objective:    Vitals:  Vitals:    01/07/22 0954   BP: 139/84   BP Location: Left arm   Patient Position: Sitting   Cuff Size: Large Adult   Pulse: 68   Resp: 18   SpO2: 98%   Weight: 76.7 kg (169 lb)   Height: 177.8 cm (70\")     Body mass index is 24.25 kg/m².      Physical Exam:    General: Alert, " cooperative, no distress, appears stated age  Head:  Normocephalic, atraumatic, mucous membranes moist  Eyes:  Conjunctiva/corneas clear, EOM's intact     Neck:  Supple,  no bruit  Lungs: Clear to auscultation bilaterally, no wheezes rhonchi rales are noted  Chest wall: No tenderness  Heart::  Regular rate and rhythm, S1 and S2 normal, 1/6 holosystolic murmur.  No rub or gallop  Abdomen: Soft, non-tender, nondistended bowel sounds active  Extremities: No cyanosis, clubbing, or edema  Pulses: 2+ and symmetric all extremities  Skin:  No rashes or lesions  Neuro/psych: A&O x3. CN II through XII are grossly intact with appropriate affect      Allergies:  Allergies   Allergen Reactions   • Contrast Dye Hives     Pt had hives all over chest and itchy palms.    Pt was pre-medicated prior to this scan.   • Tape Hives     Surgical tape       Medication Review:     Current Outpatient Medications:   •  aspirin 81 MG chewable tablet, Chew 81 mg Daily., Disp: , Rfl:   •  atorvastatin (LIPITOR) 20 MG tablet, TAKE 1 TABLET BY MOUTH DAILY, Disp: 90 tablet, Rfl: 3  •  cetirizine (zyrTEC) 10 MG tablet, Take 10 mg by mouth Daily., Disp: , Rfl:   •  Coenzyme Q10 (CoQ10) 100 MG capsule, Take  by mouth., Disp: , Rfl:   •  doxycycline (VIBRAMYCIN) 100 MG capsule, TK 1 C PO BID WF, Disp: , Rfl:   •  Dulera 200-5 MCG/ACT inhaler, USE 2 PUFFS BID, Disp: , Rfl:   •  fluticasone (FLONASE) 50 MCG/ACT nasal spray, 2 sprays into each nostril Daily., Disp: , Rfl:   •  oxybutynin (DITROPAN) 5 MG tablet, Take 5 mg by mouth 2 (two) times a day., Disp: , Rfl:     Family History:  Family History   Problem Relation Age of Onset   • Heart failure Father    • Hypertension Father    • Cancer Maternal Aunt        Past Medical History:  Past Medical History:   Diagnosis Date   • Allergic rhinitis    • Anxiety    • Asthma     controlled   • H/O Mediastinal mass    • History of colon polyps    • Hyperlipidemia    • Low iron    • Palpitations    • PNET  (primitive neuroectodermal tumor) (HCC) 2017   • Tachycardia    • Teratoma     s/p surgical excision - no evidence of malignancy noted        Past Surgical History:  Past Surgical History:   Procedure Laterality Date   • COLONOSCOPY     • COLONOSCOPY N/A 7/25/2016    Procedure: COLONOSCOPY INTO CECUM/TERMINAL ILEUM WITH POLYPECTOMY;  Surgeon: Jarvis Garcia MD;  Location: [facility] ENDOSCOPY;  Service:    • COLONOSCOPY N/A 12/21/2020    Procedure: COLONOSCOPY to cecum with cold snare polypectomies;  Surgeon: Celio Joy MD;  Location: [facility] ENDOSCOPY;  Service: Gastroenterology;  Laterality: N/A;  pre  - hx of colon polyps  post  - polyps, internal hemorrhoids   • ENDOSCOPY     • ENDOSCOPY N/A 7/25/2016    Procedure: ESOPHAGOGASTRODUODENOSCOPY WITH DILATATION;  Surgeon: Jarvis Garcia MD;  Location: [facility] ENDOSCOPY;  Service:    • FOOT SURGERY      right   • KNEE ARTHROSCOPY      right   • LASIK     • MEDIASTINAL MASS EXCISION  01/12/2017    Dr Hernández   • PERICARDIECTOMY  01/12/2017    Dr Hernández   • VASECTOMY         Social History:  Social History     Socioeconomic History   • Marital status:      Spouse name: Chloé   Tobacco Use   • Smoking status: Never Smoker   • Smokeless tobacco: Never Used   Vaping Use   • Vaping Use: Never used   Substance and Sexual Activity   • Alcohol use: Yes     Alcohol/week: 2.0 standard drinks     Types: 1 Glasses of wine, 1 Cans of beer per week   • Drug use: No   • Sexual activity: Yes     Partners: Female     Birth control/protection: Surgical       Review of Systems:  The following systems were reviewed as they relate to the cardiovascular system: Constitutional, Eyes, ENT, Cardiovascular, Respiratory, Gastrointestinal, Integumentary, Neurological, Psychiatric, Hematologic, Endocrine, Musculoskeletal, and Genitourinary. The pertinent cardiovascular findings are reported above with all other cardiovascular points within those systems being negative.    Diagnostic  Study Review:     Current Electrocardiogram:    ECG 12 Lead    Date/Time: 1/7/2022 6:16 PM  Performed by: Josue Chatterjee DO  Authorized by: Josue Chatterjee DO   Comparison: not compared with previous ECG   Previous ECG: no previous ECG available  Comments: Normal sinus rhythm with a ventricular rate of 67 bpm.  Consider left ventricular hypertrophy.  Nonspecific repolarization changes.  Normal QT and QTc intervals.  Normal QRS axis.             Laboratory data:    Lab Results   Component Value Date    GLUCOSE 124 01/14/2021    BUN 14 01/14/2021    CREATININE 1.01 01/14/2021    EGFRIFNONA 76 01/14/2021    BCR 13.9 01/14/2021    K 3.9 01/14/2021    CO2 26.2 01/14/2021    CALCIUM 9.2 01/14/2021    ALBUMIN 4.20 01/14/2021    LABIL2 1.2 01/10/2017    AST 29 01/14/2021    ALT 48 (H) 01/14/2021     Lab Results   Component Value Date    GLUCOSE 124 01/14/2021    CALCIUM 9.2 01/14/2021     01/14/2021    K 3.9 01/14/2021    CO2 26.2 01/14/2021     01/14/2021    BUN 14 01/14/2021    CREATININE 1.01 01/14/2021    EGFRIFNONA 76 01/14/2021    BCR 13.9 01/14/2021    ANIONGAP 10.8 01/14/2021     Lab Results   Component Value Date    WBC 5.03 01/14/2021    HGB 14.5 01/14/2021    HCT 41.4 01/14/2021    MCV 88.7 01/14/2021     01/14/2021     No results found for: CHOL, CHLPL, TRIG, HDL, LDL, LDLDIRECT  No results found for: HGBA1C  Lab Results   Component Value Date    INR 0.9 01/12/2017    INR 0.8 01/10/2017    PROTIME 11.6 01/12/2017    PROTIME 10.8 01/10/2017       Most Recent Echo:  Results for orders placed during the hospital encounter of 12/31/19    Adult Transthoracic Echo Complete W/ Cont if Necessary Per Protocol    Interpretation Summary  · Left ventricular systolic function is mildly decreased.  · Estimated EF appears to be in the range of 46 - 50%.  · Mild-to-moderate mitral valve regurgitation is present  · Mild tricuspid valve regurgitation is present.  · Right ventricular  cavity is mildly dilated.       Most Recent Stress Test:       Most Recent Cardiac Catheterization:   No results found for this or any previous visit.       NOTE: The following portions of the patient's note were reviewed, confirmed and/or updated this visit as appropriate: History of present illness/Interval history, physical examination, assessment & plan, allergies, current medications, past family history, past medical history, past social history, past surgical history and problem list.

## 2022-01-07 ENCOUNTER — OFFICE VISIT (OUTPATIENT)
Dept: CARDIOLOGY | Facility: CLINIC | Age: 57
End: 2022-01-07

## 2022-01-07 VITALS
SYSTOLIC BLOOD PRESSURE: 139 MMHG | BODY MASS INDEX: 24.2 KG/M2 | DIASTOLIC BLOOD PRESSURE: 84 MMHG | WEIGHT: 169 LBS | HEART RATE: 68 BPM | RESPIRATION RATE: 18 BRPM | HEIGHT: 70 IN | OXYGEN SATURATION: 98 %

## 2022-01-07 DIAGNOSIS — I51.9 DECREASED LEFT VENTRICULAR SYSTOLIC FUNCTION: ICD-10-CM

## 2022-01-07 DIAGNOSIS — I42.0 CARDIOMYOPATHY, DILATED: Primary | ICD-10-CM

## 2022-01-07 DIAGNOSIS — D48.9 TERATOMA: ICD-10-CM

## 2022-01-07 DIAGNOSIS — J98.59 MEDIASTINAL MASS: ICD-10-CM

## 2022-01-07 DIAGNOSIS — E78.2 MIXED HYPERLIPIDEMIA: ICD-10-CM

## 2022-01-07 DIAGNOSIS — D15.2: ICD-10-CM

## 2022-01-07 PROCEDURE — 93000 ELECTROCARDIOGRAM COMPLETE: CPT | Performed by: INTERNAL MEDICINE

## 2022-01-07 PROCEDURE — 99214 OFFICE O/P EST MOD 30 MIN: CPT | Performed by: INTERNAL MEDICINE

## 2022-01-31 ENCOUNTER — LAB (OUTPATIENT)
Dept: LAB | Facility: HOSPITAL | Age: 57
End: 2022-01-31

## 2022-01-31 ENCOUNTER — HOSPITAL ENCOUNTER (OUTPATIENT)
Dept: PET IMAGING | Facility: HOSPITAL | Age: 57
Discharge: HOME | End: 2022-01-31

## 2022-01-31 DIAGNOSIS — C71.9 PNET (PRIMITIVE NEUROECTODERMAL TUMOR): ICD-10-CM

## 2022-01-31 LAB
ALBUMIN SERPL-MCNC: 4.5 G/DL (ref 3.5–5.2)
ALBUMIN/GLOB SERPL: 1.7 G/DL (ref 1.1–2.4)
ALP SERPL-CCNC: 94 U/L (ref 38–116)
ALPHA-FETOPROTEIN: 3.24 NG/ML (ref 0–8.3)
ALT SERPL W P-5'-P-CCNC: 48 U/L (ref 0–41)
ANION GAP SERPL CALCULATED.3IONS-SCNC: 12.3 MMOL/L (ref 5–15)
AST SERPL-CCNC: 32 U/L (ref 0–40)
BASOPHILS # BLD AUTO: 0.03 10*3/MM3 (ref 0–0.2)
BASOPHILS NFR BLD AUTO: 0.7 % (ref 0–1.5)
BILIRUB SERPL-MCNC: 0.4 MG/DL (ref 0.2–1.2)
BUN SERPL-MCNC: 15 MG/DL (ref 6–20)
BUN/CREAT SERPL: 14.4 (ref 7.3–30)
CALCIUM SPEC-SCNC: 9.1 MG/DL (ref 8.5–10.2)
CHLORIDE SERPL-SCNC: 106 MMOL/L (ref 98–107)
CO2 SERPL-SCNC: 25.7 MMOL/L (ref 22–29)
CREAT SERPL-MCNC: 1.04 MG/DL (ref 0.7–1.3)
DEPRECATED RDW RBC AUTO: 40.6 FL (ref 37–54)
EOSINOPHIL # BLD AUTO: 0.09 10*3/MM3 (ref 0–0.4)
EOSINOPHIL NFR BLD AUTO: 2.1 % (ref 0.3–6.2)
ERYTHROCYTE [DISTWIDTH] IN BLOOD BY AUTOMATED COUNT: 12.3 % (ref 12.3–15.4)
GFR SERPL CREATININE-BSD FRML MDRD: 74 ML/MIN/1.73
GLOBULIN UR ELPH-MCNC: 2.6 GM/DL (ref 1.8–3.5)
GLUCOSE BLDC GLUCOMTR-MCNC: 105 MG/DL (ref 70–130)
GLUCOSE SERPL-MCNC: 97 MG/DL (ref 74–124)
HCT VFR BLD AUTO: 45.8 % (ref 37.5–51)
HGB BLD-MCNC: 15.5 G/DL (ref 13–17.7)
IMM GRANULOCYTES # BLD AUTO: 0.01 10*3/MM3 (ref 0–0.05)
IMM GRANULOCYTES NFR BLD AUTO: 0.2 % (ref 0–0.5)
LYMPHOCYTES # BLD AUTO: 1.88 10*3/MM3 (ref 0.7–3.1)
LYMPHOCYTES NFR BLD AUTO: 43.7 % (ref 19.6–45.3)
MCH RBC QN AUTO: 30.8 PG (ref 26.6–33)
MCHC RBC AUTO-ENTMCNC: 33.8 G/DL (ref 31.5–35.7)
MCV RBC AUTO: 91.1 FL (ref 79–97)
MONOCYTES # BLD AUTO: 0.43 10*3/MM3 (ref 0.1–0.9)
MONOCYTES NFR BLD AUTO: 10 % (ref 5–12)
NEUTROPHILS NFR BLD AUTO: 1.86 10*3/MM3 (ref 1.7–7)
NEUTROPHILS NFR BLD AUTO: 43.3 % (ref 42.7–76)
NRBC BLD AUTO-RTO: 0 /100 WBC (ref 0–0.2)
PLATELET # BLD AUTO: 241 10*3/MM3 (ref 140–450)
PMV BLD AUTO: 9 FL (ref 6–12)
POTASSIUM SERPL-SCNC: 4 MMOL/L (ref 3.5–4.7)
PROT SERPL-MCNC: 7.1 G/DL (ref 6.3–8)
RBC # BLD AUTO: 5.03 10*6/MM3 (ref 4.14–5.8)
SODIUM SERPL-SCNC: 144 MMOL/L (ref 134–145)
WBC NRBC COR # BLD: 4.3 10*3/MM3 (ref 3.4–10.8)

## 2022-01-31 PROCEDURE — 36415 COLL VENOUS BLD VENIPUNCTURE: CPT

## 2022-01-31 PROCEDURE — 80053 COMPREHEN METABOLIC PANEL: CPT

## 2022-01-31 PROCEDURE — 0 FLUDEOXYGLUCOSE F18 SOLUTION: Performed by: INTERNAL MEDICINE

## 2022-01-31 PROCEDURE — 82962 GLUCOSE BLOOD TEST: CPT

## 2022-01-31 PROCEDURE — A9552 F18 FDG: HCPCS | Performed by: INTERNAL MEDICINE

## 2022-01-31 PROCEDURE — 85025 COMPLETE CBC W/AUTO DIFF WBC: CPT

## 2022-01-31 PROCEDURE — 78815 PET IMAGE W/CT SKULL-THIGH: CPT

## 2022-01-31 PROCEDURE — 82105 ALPHA-FETOPROTEIN SERUM: CPT | Performed by: INTERNAL MEDICINE

## 2022-01-31 RX ADMIN — FLUDEOXYGLUCOSE F18 1 DOSE: 300 INJECTION INTRAVENOUS at 10:58

## 2022-02-01 LAB — HCG INTACT+B SERPL-ACNC: <1 MIU/ML (ref 0–3)

## 2022-02-07 ENCOUNTER — TELEMEDICINE (OUTPATIENT)
Dept: ONCOLOGY | Facility: CLINIC | Age: 57
End: 2022-02-07

## 2022-02-07 DIAGNOSIS — C71.9 PNET (PRIMITIVE NEUROECTODERMAL TUMOR): ICD-10-CM

## 2022-02-07 DIAGNOSIS — Z86.010 HISTORY OF COLON POLYPS: Primary | ICD-10-CM

## 2022-02-07 PROCEDURE — 99214 OFFICE O/P EST MOD 30 MIN: CPT | Performed by: INTERNAL MEDICINE

## 2022-02-07 NOTE — PROGRESS NOTES
Subjective     REASON FOR FOLLOWUP:    1. 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  Relative lymphocytosis negative flow cytometry  3.  Close follow-up planned per recommendation of Dr. Gonzalez at [facility]  4.    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      He is doing a video visit today because he has moved to [facility] and had a work conflict    We did a follow-up chest CT of the chest because of a PET positive axillary node which I felt was related to his Covid vaccination the last time was here and thankfully that was negative this time he has had a follow-up final PET scan which was also thankfully completely negative and his beta-hCG in alpha-fetoprotein are normal    He has a new primary care doctor he is following up with the  and dermatology    Vaccinated against COVID-19    He has no complaints and is doing well otherwise he is decided not to go for his testicular ultrasound and I think this is fine    He has no chest pain cough or systemic complaints.  He has noticed no masses in his testicle-     He is been to the dermatologist to show him the mole that I wanted checked out and they're keeping a close eye on this      No further follow-up is needed in our office    Past Medical History:   Diagnosis Date   • Allergic rhinitis    • Anxiety    • Asthma     controlled   • H/O Mediastinal mass    • History of colon polyps    • Hyperlipidemia    • Low iron    • Palpitations    • PNET (primitive  neuroectodermal tumor) (HCC) 2017   • Tachycardia    • Teratoma     s/p surgical excision - no evidence of malignancy noted         Past Surgical History:   Procedure Laterality Date   • COLONOSCOPY     • COLONOSCOPY N/A 7/25/2016    Procedure: COLONOSCOPY INTO CECUM/TERMINAL ILEUM WITH POLYPECTOMY;  Surgeon: Jarvis Garcia MD;  Location: [facility] ENDOSCOPY;  Service:    • COLONOSCOPY N/A 12/21/2020    Procedure: COLONOSCOPY to cecum with cold snare polypectomies;  Surgeon: Celio Joy MD;  Location: [facility] ENDOSCOPY;  Service: Gastroenterology;  Laterality: N/A;  pre  - hx of colon polyps  post  - polyps, internal hemorrhoids   • ENDOSCOPY     • ENDOSCOPY N/A 7/25/2016    Procedure: ESOPHAGOGASTRODUODENOSCOPY WITH DILATATION;  Surgeon: Jarvis Garcia MD;  Location: [facility] ENDOSCOPY;  Service:    • FOOT SURGERY      right   • KNEE ARTHROSCOPY      right   • LASIK     • MEDIASTINAL MASS EXCISION  01/12/2017    Dr Hernández   • PERICARDIECTOMY  01/12/2017    Dr Hernández   • VASECTOMY        Oncologic history   patient is a 51-year-old male who presented to his family doctor in September 2015 with a cough that developed while he was lying on his back.  Patient was felt to have allergies and given some medications for this but the symptoms don't improve.  He was then referred to ENT who evaluated him and scoped him and thought he had reflux symptoms and gave her medications for this.  Patient still do not get better once back to see the ENT doctor was CAT scan the sinuses and thought he may have some sinus problems.  He was then referred to an allergist who diagnosed some allergies and gave her medications again with no significant improvement.  He is treated for asthma and again did not improve.  Finally when his symptoms worsened a chest CT was done the end of December which showed a 16 x 10 x 11 cm anterior mediastinal mass with no parenchymal nodules or adenopathy in the chest or abdomen.  Patient saw Dr. Hernández who  ordered beta hCG and alpha-fetoprotein which were normal and then proceeded with a resection of the tumor which was done on 1/12/17 .    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  The tumor was lifted off the great vessels and apparently was completely resected in toto.  The cut surface of the tumor showed multiple cysts with very little solid tissue present between cystic lesions in numerous areas of possible calcification was identified .  The slides were reviewed at [facility] who confirmed the diagnosis.    postop really the patient has done very well and his breathing and discomfort have dramatically improved and he stopped most of his inhalers and asthma medications.  He's had no weight loss headache or other systemic complaints.  The patient reports chronic back pain due to an injury to L4-L5 and S1 many years ago and some frequency of urination at night but otherwise feels very well and actually moved into a new home 2 weeks before his surgery.     He was sent to [facility] see Dr. Taj Gonzalez for an opinion regarding adjuvant treatment and Dr. Gonzalez reviewed his case and recommended close follow-up with CAT scans every 2 months for the first 6 months and then less frequently thereafter with plans for re-surgery if there is abnormality detected and then adjuvant treatment with CAV alternating with ifosfamide and etoposide.  Flow cytometry of his peripheral blood was unremarkable    5/17  patient is a 51-year-old male with recent diagnosis of a mediastinal germ cell tumor which is predominately teratoma with a focus of primitive neuroectodermal tumor.  He was sent to  [facility] see Dr. Taj Gonzalez for an opinion regarding adjuvant treatment and Dr. Gonzalez reviewed his case and recommended close follow-up with CAT scans every 2 months for the first 6 months and then less frequently thereafter with plans for re-surgery if there is abnormality detected and then adjuvant treatment with CAV alternating with ifosfamide and etoposide.  Con is back to review his CAT scans and overall is feeling well.  He is exercising but not able to lose weight and is actually gained a little weight which she surprised by.  His white count shows a 22% eosinophilia which is new but he tells me he is resumed his allergy shots last month and this may explain it.  He has no other symptoms at this time  I reviewed his CAT scans and they show no change with some insignificant mediastinal nodes and a small lung nodule that was present prior to his original surgery and 2 hemangiomas in the liver that are stable.  AFP and beta hCG 2 months ago were normal and results are pending today.  7/17  Scans negative patient seen by the nurse practitioner in Dr. Cardoso's absence-tumor markers normal  9/17    His scans in July were fine but I was unavailable that day because of a medical emergency and the nurse practitioner reviewed the scans.  He was complaining of tenderness in the scrotum and an ultrasound was done which was thankfully benign and the symptoms resolved.  Since then he has been to see Dr. Gonzalez and unfortunately due to our  new EMR notes are not routinely forwarded to referring doctors and I have made an effort to have the notes sent to Dr. Gonzalez today along with the disks from his scans and thankfully his CAT scan from last week also is completely benign.  His tumor markers are pending from today but have been negative so far.  Dr. Gonzalez has recommended moving to 6 months  imaging the patient is still very anxious about recurrence of his disease and I tried to reassure him. we'll get  one more scan in 3-4 months before moving to 6 months scans.    1/20    We set him up for genetic testing because of his colon polyps his atypical nevi and his testicular cancer and some unusual family history and it showed a V US in the kit gene but nothing else significant    1/21  He is due for colonoscopy this year and his old gastroenterologist has retired and I referred him to Dr. Celio Londono colonoscopy in December showed 3 tubular adenomas    Cardiac evaluation and echo shows a decreased ejection fraction etiology unclear EF of 45 to 50%    His PET scan is essentially negative except for mild increased uptake in left axillary node compared to previously without a change in size.  On further questioning he had his COVID-19 vaccine in the left upper arm 1 week before his PET scan and I am almost sure this is the cause of the increased uptake in the node-to be safe however we will repeat a chest CT in 3 months      PET scan will be done in 6 months because of his severe contrast allergy and annually through year5 per the recommendations of Dr. Gonzalez      Current Outpatient Medications on File Prior to Visit   Medication Sig Dispense Refill   • aspirin 81 MG chewable tablet Chew 81 mg Daily.     • atorvastatin (LIPITOR) 20 MG tablet TAKE 1 TABLET BY MOUTH DAILY 90 tablet 3   • cetirizine (zyrTEC) 10 MG tablet Take 10 mg by mouth Daily.     • Coenzyme Q10 (CoQ10) 100 MG capsule Take  by mouth.     • doxycycline (VIBRAMYCIN) 100 MG capsule TK 1 C PO BID WF     • Dulera 200-5 MCG/ACT inhaler USE 2 PUFFS BID     • fluticasone (FLONASE) 50 MCG/ACT nasal spray 2 sprays into each nostril Daily.     • oxybutynin (DITROPAN) 5 MG tablet Take 5 mg by mouth 2 (two) times a day.       No current facility-administered medications on file prior to visit.        ALLERGIES:    Allergies   Allergen Reactions   • Contrast Dye Hives     Pt had hives all over chest and itchy palms.    Pt was pre-medicated prior to this scan.   •  Tape Hives     Surgical tape        Social History     Socioeconomic History   • Marital status:      Spouse name: Chloé   Tobacco Use   • Smoking status: Never Smoker   • Smokeless tobacco: Never Used   Vaping Use   • Vaping Use: Never used   Substance and Sexual Activity   • Alcohol use: Yes     Alcohol/week: 2.0 standard drinks     Types: 1 Glasses of wine, 1 Cans of beer per week   • Drug use: No   • Sexual activity: Yes     Partners: Female     Birth control/protection: Surgical        Family History   Problem Relation Age of Onset   • Heart failure Father    • Hypertension Father    • Cancer Maternal Aunt     parents are in their 70s and healthy his one sister is healthy and 3 children are healthy maternal aunt had an unusual cancer in the gallbladder in her 40s his paternal grandmother had malignancy in her 40s of unknown type    Review of Systems   Constitutional: Negative.  Negative for fatigue.   HENT: Negative for mouth sores and sore throat.    Respiratory: Negative for chest tightness and shortness of breath.    Cardiovascular: Negative for chest pain.   Gastrointestinal: Negative for abdominal pain, constipation, diarrhea, nausea and vomiting.   Genitourinary: Negative for difficulty urinating.   Musculoskeletal: Negative for back pain.   Neurological: Negative for dizziness.   Psychiatric/Behavioral: Negative for sleep disturbance. The patient is not nervous/anxious.    All other systems reviewed and are negative.       Objective     There were no vitals filed for this visit.  Current Status 1/14/2021   ECOG score 0       Physical Exam    GENERAL:  Well-developed, well-nourished in no acute distress.   SKIN:  Warm, dry without rashes, purpura or petechiae.Numerous atypical nevi noted   EYES:  Pupils equal, round and reactive to light.  EOMs intact.  Conjunctivae normal.  EARS:  Hearing intact.  NOSE:  Septum midline.  No excoriations or nasal discharge.  MOUTH:  Tongue is well-papillated; no  stomatitis or ulcers.  Lips normal.  THROAT:  Oropharynx without lesions or exudates.  NECK:  Supple with good range of motion; no thyromegaly or masses, no JVD.  LYMPHATICS:  No cervical, supraclavicular, axillary or inguinal adenopathy.  No left axillary nodes  CHEST:  Lungs clear to auscultation. Good airflow.  Sternotomy incision is well-healed  CARDIAC:  Regular rate and rhythm without murmurs, rubs or gallops. Normal S1,S2.  ABDOMEN:  Soft, nontender with no hepatosplenomegaly or masses.  Testicular exam deferred  EXTREMITIES:  No clubbing, cyanosis or edema.  NEUROLOGICAL:  Cranial Nerves II-XII grossly intact.  No focal neurological deficits.  PSYCHIATRIC:  Normal affect and mood.    I have reexamined the patient and the results are consistent with the previously documented exam. Thanh Cardoso MD   Video visit no physical    RECENT LABS:  Hematology WBC   Date Value Ref Range Status   01/31/2022 4.30 3.40 - 10.80 10*3/mm3 Final     RBC   Date Value Ref Range Status   01/31/2022 5.03 4.14 - 5.80 10*6/mm3 Final     Hemoglobin   Date Value Ref Range Status   01/31/2022 15.5 13.0 - 17.7 g/dL Final     Hematocrit   Date Value Ref Range Status   01/31/2022 45.8 37.5 - 51.0 % Final     Platelets   Date Value Ref Range Status   01/31/2022 241 140 - 450 10*3/mm3 Final        AFP and beta hCG are normal in 12/18        SCROTAL SONOGRAPHY WITH TESTICULAR DOPPLER     FINDINGS: Sonographic evaluation of the scrotal contents was performed.  The right testicle measures 4.5 x 2.6 x 2 cm and the left testicle  measures 4.2 x 3.2 x 1.9 cm. There are punctate echogenic foci seen in  both testes on the order of 2-3 in number. This is consistent with  minimal bilateral testicular microlithiasis. Normal vascularity is seen  within both testes. There is a 0.7 cm right epididymal head cyst versus  hematocele. A 1.6 cm left epididymal head cyst versus spermatocele is  also noted. No other abnormality is appreciated.      IMPRESSION:  1. Minimal bilateral testicular microlithiasis with approximately 2-3  punctate calcifications seen in both testes. The relative paucity of  microcalcifications argue against any active abnormality at this time.  However, follow-up scrotal sonography in one year is recommended.  2. Bilateral epididymal head cysts versus spermatoceles as described.   This report was finalized on 7/23/2018 9        Study Result     IMPRESSION:  1.  There is a left axillary node which while grossly unchanged in size  since 2018, it has increased in FDG uptake and is now mildly above that  of blood pool. While findings are favored be reactive given its overall  size stability, they remain indeterminate given patient history and  follow-up with chest CT with intravenous contrast in 3 months is  recommended to ensure stability and exclude early metastatic disease.  2.  No findings of new FDG avid disease within the neck, abdomen or  pelvis.  3.  Other findings as above.     This report was finalized on 1/11/2021        PET     IMPRESSION:  1.  No findings of FDG avid disease in the neck, chest, abdomen or  pelvis.  2.  Incidental and chronic findings as above.     This report was finalized on 2/1/2022 2       Assessment/Plan     1.  Mediastinal germ cell tumor-  Completely resected 1/17 with normal preop alpha fetoprotein beta-hCG and a focus of primitive neuroectodermal tumor in the resected specimen which is predominantly mature teratoma  · Negative PET scan as of 11/19- 3  years from diagnosis  · PET scan in 1/21 with increased uptake left axillary node most likely due to COVID-19 vaccination in that arm 1 week before PET scan-tumor markers negative  · Follow-up CAT scan of the chest in 4/21 shows regression node size and questionable scarring left upper lobe follow-up scan planned in September  · PET scan dated 1/22 - no further follow-up planned    2.  Eosinophilia probably due to  allergy shots -resolved  3.  Atypical  nevi evaluated by dermatology   4.  .  Tubular adenoma on colonoscopy in [id number] in 3 years  · 3 tubular adenomas on colonoscopy in 12/20  5  Significant allergic rash to IV contrast in 6/18 while on steroids-no further IV contrast as far as possible  6.  Genetic testing negative except for a VUS in the KIT gene    Plan  1.  No further follow-up needed  2.  Needs GI follow-up for multiple colon polyps  follow-up for his testicular evaluations and dermatology for atypical nevi    Video visit 20 minutes

## 2022-09-30 RX ORDER — ATORVASTATIN CALCIUM 20 MG/1
20 TABLET, FILM COATED ORAL DAILY
Qty: 90 TABLET | Refills: 3 | Status: SHIPPED | OUTPATIENT
Start: 2022-09-30

## 2023-10-03 RX ORDER — ATORVASTATIN CALCIUM 20 MG/1
TABLET, FILM COATED ORAL
Qty: 90 TABLET | Refills: 3 | Status: SHIPPED | OUTPATIENT
Start: 2023-10-03

## (undated) DEVICE — TUBING, SUCTION, 1/4" X 10', STRAIGHT: Brand: MEDLINE

## (undated) DEVICE — THE SINGLE USE ETRAP – POLYP TRAP IS USED FOR SUCTION RETRIEVAL OF ENDOSCOPICALLY REMOVED POLYPS.: Brand: ETRAP

## (undated) DEVICE — ADAPT CLN BIOGUARD AIR/H2O DISP

## (undated) DEVICE — SENSR O2 OXIMAX FNGR A/ 18IN NONSTR

## (undated) DEVICE — KT ORCA ORCAPOD DISP STRL

## (undated) DEVICE — LN SMPL CO2 SHTRM SD STREAM W/M LUER

## (undated) DEVICE — CANN O2 ETCO2 FITS ALL CONN CO2 SMPL A/ 7IN DISP LF

## (undated) DEVICE — THE TORRENT IRRIGATION SCOPE CONNECTOR IS USED WITH THE TORRENT IRRIGATION TUBING TO PROVIDE IRRIGATION FLUIDS SUCH AS STERILE WATER DURING GASTROINTESTINAL ENDOSCOPIC PROCEDURES WHEN USED IN CONJUNCTION WITH AN IRRIGATION PUMP (OR ELECTROSURGICAL UNIT).: Brand: TORRENT

## (undated) DEVICE — SNAR POLYP SENSATION STDOVL 27 240 BX40